# Patient Record
Sex: FEMALE | Race: WHITE | NOT HISPANIC OR LATINO | Employment: FULL TIME | URBAN - NONMETROPOLITAN AREA
[De-identification: names, ages, dates, MRNs, and addresses within clinical notes are randomized per-mention and may not be internally consistent; named-entity substitution may affect disease eponyms.]

---

## 2018-01-10 NOTE — PROGRESS NOTES
Chief Complaint  PATIENT PRESENTED TO THE OFFICE THIS MORNING FOR HER B12 INJECTION  PATIENT PROVIDES HER OWN SERUM  SHE RECEIVED THE INJECTION IN HER LEFT DELTOID MUSCLE      Active Problems    1  Abdominal pain, epigastric (789 06) (R10 13)   2  Adult hypothyroidism (244 9) (E03 9)   3  Anxiety disorder (300 00) (F41 9)   4  Paresthesias (782 0) (R20 2)   5  Screening for ischemic heart disease (V81 0) (Z13 6)   6  Vitamin B12 deficiency (266 2) (E53 8)    Current Meds   1  BD Luer-Ramana Syringe 23G X 1" 3 ML Miscellaneous; USE AS DIRECTED; Therapy: 36VAM9100 to (Last Rx:16Jup9582)  Requested for: 94ZCW8791 Ordered   2  Cyanocobalamin 1000 MCG/ML Injection Solution; Inject 1 ml intramuscularly weekly for   4 weeks,    then inject 1 ml  intramuscularly every other week for 4 weeks; Therapy: 03AVF0350 to (Last Rx:39Jhr8948)  Requested for: 43FRH3112 Ordered   3  Levothyroxine Sodium 25 MCG Oral Tablet; TAKE 1 TABLET DAILY AS DIRECTED; Therapy: 55MLL6429 to (Evaluate:06Jun2016)  Requested for: 89TAV3862; Last   Rx:[id number] Ordered   4  Pantoprazole Sodium 40 MG Oral Tablet Delayed Release; TAKE 1 TABLET DAILY; Therapy: 82Xap5146 to (Evaluate:23Goo7009)  Requested for: 60Iyc6338; Last   Rx:02Pfe9671 Ordered    Allergies    1   No Known Drug Allergies    Future Appointments    Date/Time Provider Specialty Site   01/18/2016 08:45 AM [address] [place] [address], Nurse Schedule  ST [address] [place] [address]   Electronically signed by : Rojelio Terrazas MD; Jan 11 2016  5:21PM EST                       (Author)

## 2018-01-15 NOTE — PROGRESS NOTES
Chief Complaint  PATIENT HAD A SCHEDULED VISIT TODAY FOR HER B12 INJECTION  THE PATIENT PROVIDES HER OWN SERUM  THE SHOT WAS PLACED IN HER LEFT DELTOID      Active Problems     1  Abdominal pain, epigastric (789 06) (R10 13)   2  Anxiety disorder (300 00) (F41 9)   3  Paresthesias (782 0) (R20 2)   4  Screening for ischemic heart disease (V81 0) (Z13 6)    Vitamin B12 deficiency (266 2) (E53 8)       Adult hypothyroidism (244 9) (E03 9)          Current Meds   1  BD Luer-Ramana Syringe 23G X 1" 3 ML Miscellaneous; USE AS DIRECTED; Therapy: 00KYB3114 to (Last Rx:78Bbz9547)  Requested for: 83OCI1203 Ordered   2  Cyanocobalamin 1000 MCG/ML Injection Solution; Inject 1 ml intramuscularly weekly for   4 weeks,    then inject 1 ml  intramuscularly every other week for 4 weeks; Therapy: 76FJU7508 to (Last Rx:98Wah3524)  Requested for: 25RTC5390 Ordered   3  Levothyroxine Sodium 25 MCG Oral Tablet; TAKE 1 TABLET DAILY AS DIRECTED; Therapy: 71JFM1386 to (Evaluate:06Jun2016)  Requested for: 10CJK6218; Last   Rx:17Hvj0218 Ordered   4  Pantoprazole Sodium 40 MG Oral Tablet Delayed Release; TAKE 1 TABLET DAILY; Therapy: 04Ipz0631 to (Evaluate:00Wcc9177)  Requested for: 02Apr2015; Last   Rx:[id number] Ordered    Allergies    1   No Known Drug Allergies    Signatures   Electronically signed by : Jocelyn Olivarez MD; Apr 15 2016  9:48PM EST                       (Author)

## 2018-01-15 NOTE — PROGRESS NOTES
Chief Complaint  PATIENT WAS SEEN IN OFFICE TODAY FOR HER B12 INJECTION      Active Problems    1  Abdominal pain, epigastric (789 06) (R10 13)   2  Adult hypothyroidism (244 9) (E03 9)   3  Anxiety disorder (300 00) (F41 9)   4  Paresthesias (782 0) (R20 2)   5  Screening for ischemic heart disease (V81 0) (Z13 6)   6  Vitamin B12 deficiency (266 2) (E53 8)    Current Meds   1  BD Luer-Ramana Syringe 23G X 1" 3 ML Miscellaneous; USE AS DIRECTED; Therapy: 53KWK0718 to (Last Rx:90Yoz0976)  Requested for: 97KMU9547 Ordered   2  Cyanocobalamin 1000 MCG/ML Injection Solution; Inject 1 ml intramuscularly weekly for   4 weeks,    then inject 1 ml  intramuscularly every other week for 4 weeks; Therapy: 14DZY9022 to (Last Rx:23Tmv2064)  Requested for: 21ESH8535 Ordered   3  Levothyroxine Sodium 25 MCG Oral Tablet; TAKE 1 TABLET DAILY AS DIRECTED; Therapy: 14LVU3705 to (Evaluate:06Jun2016)  Requested for: 63WEK2171; Last   Rx:26Acg4181 Ordered   4  Pantoprazole Sodium 40 MG Oral Tablet Delayed Release; TAKE 1 TABLET DAILY; Therapy: 41Hvt2565 to (Evaluate:70Cky7956)  Requested for: 01Yio1166; Last   Rx:70Gao2859 Ordered    Allergies    1   No Known Drug Allergies    Future Appointments    Date/Time Provider Specialty Site   02/01/2016 08:30 AM [facility], Nurse Schedule  ST [address] [place] [address]   Electronically signed by : Giulia Cast MD; Jan 18 2016  5:31PM EST                       (Author)

## 2018-01-18 NOTE — PROGRESS NOTES
Chief Complaint  PATIENT HAD A SCHEDULED VISIT FOR A B12 SHOT  THE PATIENT PROVIDES HER OWN SERUM  THE SHOT WAS GIVEN IN THE RIGHT DELTOID      Active Problems    1  Abdominal pain, epigastric (789 06) (R10 13)   2  Adult hypothyroidism (244 9) (E03 9)   3  Anxiety disorder (300 00) (F41 9)   4  Paresthesias (782 0) (R20 2)   5  Screening for ischemic heart disease (V81 0) (Z13 6)   6  Vitamin B12 deficiency (266 2) (E53 8)    Current Meds   1  BD Luer-Ramana Syringe 23G X 1" 3 ML Miscellaneous; USE AS DIRECTED; Therapy: 08VFX1363 to (Last Rx:41Qdq9442)  Requested for: 45AHH8768 Ordered   2  Cyanocobalamin 1000 MCG/ML Injection Solution; Inject 1 ml intramuscularly weekly for   4 weeks,    then inject 1 ml  intramuscularly every other week for 4 weeks; Therapy: 35TXB8506 to (Last Rx:27Sty4454)  Requested for: 11YKS0389 Ordered   3  Levothyroxine Sodium 25 MCG Oral Tablet; TAKE 1 TABLET DAILY AS DIRECTED; Therapy: 01JMD0136 to (Evaluate:06Jun2016)  Requested for: 35OJU6911; Last   Rx:86Ybu5036 Ordered   4  Pantoprazole Sodium 40 MG Oral Tablet Delayed Release; TAKE 1 TABLET DAILY; Therapy: 95Nba0015 to (Evaluate:84Pvb2935)  Requested for: 43Sgw6623; Last   Rx:11Cwb9713 Ordered    Allergies    1   No Known Drug Allergies    Future Appointments    Date/Time Provider Specialty Site   02/15/2016 08:30 AM [facility], Nurse Schedule  ST [address] [place] [address]     Signatures   Electronically signed by : Kasey Aguirre MD; Feb 7 2016 10:10PM EST                       (Author)

## 2020-04-27 ENCOUNTER — HOSPITAL ENCOUNTER (EMERGENCY)
Facility: HOSPITAL | Age: 45
End: 2020-04-27
Admitting: EMERGENCY MEDICINE
Payer: OTHER MISCELLANEOUS

## 2020-04-27 ENCOUNTER — APPOINTMENT (EMERGENCY)
Dept: RADIOLOGY | Facility: HOSPITAL | Age: 45
End: 2020-04-27
Payer: OTHER MISCELLANEOUS

## 2020-04-27 ENCOUNTER — HOSPITAL ENCOUNTER (OUTPATIENT)
Facility: HOSPITAL | Age: 45
Setting detail: OBSERVATION
Discharge: HOME | End: 2020-04-27
Attending: EMERGENCY MEDICINE | Admitting: ORTHOPAEDIC SURGERY
Payer: OTHER MISCELLANEOUS

## 2020-04-27 ENCOUNTER — APPOINTMENT (OUTPATIENT)
Dept: RADIOLOGY | Facility: HOSPITAL | Age: 45
End: 2020-04-27
Payer: OTHER MISCELLANEOUS

## 2020-04-27 ENCOUNTER — ANESTHESIA (EMERGENCY)
Dept: PERIOP | Facility: HOSPITAL | Age: 45
End: 2020-04-27
Payer: OTHER MISCELLANEOUS

## 2020-04-27 ENCOUNTER — ANESTHESIA EVENT (EMERGENCY)
Dept: PERIOP | Facility: HOSPITAL | Age: 45
End: 2020-04-27
Payer: OTHER MISCELLANEOUS

## 2020-04-27 VITALS
RESPIRATION RATE: 20 BRPM | TEMPERATURE: 98.8 F | DIASTOLIC BLOOD PRESSURE: 66 MMHG | BODY MASS INDEX: 21.12 KG/M2 | OXYGEN SATURATION: 90 % | SYSTOLIC BLOOD PRESSURE: 112 MMHG | HEART RATE: 88 BPM | WEIGHT: 143 LBS

## 2020-04-27 VITALS
RESPIRATION RATE: 20 BRPM | BODY MASS INDEX: 21.23 KG/M2 | OXYGEN SATURATION: 100 % | HEART RATE: 71 BPM | TEMPERATURE: 97.6 F | WEIGHT: 143.74 LBS | SYSTOLIC BLOOD PRESSURE: 107 MMHG | DIASTOLIC BLOOD PRESSURE: 69 MMHG

## 2020-04-27 DIAGNOSIS — S61.412D LACERATION OF LEFT HAND, FOREIGN BODY PRESENCE UNSPECIFIED, SUBSEQUENT ENCOUNTER: ICD-10-CM

## 2020-04-27 DIAGNOSIS — S62.605A CLOSED DISPLACED FRACTURE OF PHALANX OF LEFT RING FINGER, UNSPECIFIED PHALANX, INITIAL ENCOUNTER: ICD-10-CM

## 2020-04-27 DIAGNOSIS — S63.259A DISLOCATION OF FINGER, INITIAL ENCOUNTER: ICD-10-CM

## 2020-04-27 DIAGNOSIS — S61.209D DEGLOVING INJURY OF FINGER, SUBSEQUENT ENCOUNTER: ICD-10-CM

## 2020-04-27 DIAGNOSIS — S68.119A AMPUTATION FINGER, INITIAL ENCOUNTER: Primary | ICD-10-CM

## 2020-04-27 DIAGNOSIS — T14.8XXA DEGLOVING INJURY: Primary | ICD-10-CM

## 2020-04-27 LAB
ABO GROUP BLD: NORMAL
ANION GAP SERPL CALCULATED.3IONS-SCNC: 6 MMOL/L (ref 4–13)
APTT PPP: 26 SECONDS (ref 23–37)
BASOPHILS # BLD AUTO: 0.09 THOUSANDS/ΜL (ref 0–0.1)
BASOPHILS NFR BLD AUTO: 1 % (ref 0–1)
BLD GP AB SCN SERPL QL: NEGATIVE
BUN SERPL-MCNC: 9 MG/DL (ref 5–25)
CALCIUM SERPL-MCNC: 8.2 MG/DL (ref 8.3–10.1)
CHLORIDE SERPL-SCNC: 106 MMOL/L (ref 100–108)
CO2 SERPL-SCNC: 28 MMOL/L (ref 21–32)
CREAT SERPL-MCNC: 0.93 MG/DL (ref 0.6–1.3)
EOSINOPHIL # BLD AUTO: 0.17 THOUSAND/ΜL (ref 0–0.61)
EOSINOPHIL NFR BLD AUTO: 3 % (ref 0–6)
ERYTHROCYTE [DISTWIDTH] IN BLOOD BY AUTOMATED COUNT: 13 % (ref 11.6–15.1)
GFR SERPL CREATININE-BSD FRML MDRD: 74 ML/MIN/1.73SQ M
GLUCOSE SERPL-MCNC: 104 MG/DL (ref 65–140)
HCG SERPL QL: NEGATIVE
HCT VFR BLD AUTO: 35.8 % (ref 34.8–46.1)
HGB BLD-MCNC: 12.1 G/DL (ref 11.5–15.4)
IMM GRANULOCYTES # BLD AUTO: 0.02 THOUSAND/UL (ref 0–0.2)
IMM GRANULOCYTES NFR BLD AUTO: 0 % (ref 0–2)
INR PPP: 0.98 (ref 0.84–1.19)
LYMPHOCYTES # BLD AUTO: 2.75 THOUSANDS/ΜL (ref 0.6–4.47)
LYMPHOCYTES NFR BLD AUTO: 44 % (ref 14–44)
MCH RBC QN AUTO: 32.7 PG (ref 26.8–34.3)
MCHC RBC AUTO-ENTMCNC: 33.8 G/DL (ref 31.4–37.4)
MCV RBC AUTO: 97 FL (ref 82–98)
MONOCYTES # BLD AUTO: 0.48 THOUSAND/ΜL (ref 0.17–1.22)
MONOCYTES NFR BLD AUTO: 8 % (ref 4–12)
NEUTROPHILS # BLD AUTO: 2.72 THOUSANDS/ΜL (ref 1.85–7.62)
NEUTS SEG NFR BLD AUTO: 44 % (ref 43–75)
NRBC BLD AUTO-RTO: 0 /100 WBCS
PLATELET # BLD AUTO: 314 THOUSANDS/UL (ref 149–390)
PMV BLD AUTO: 9.5 FL (ref 8.9–12.7)
POTASSIUM SERPL-SCNC: 3.6 MMOL/L (ref 3.5–5.3)
PROTHROMBIN TIME: 13 SECONDS (ref 11.6–14.5)
RBC # BLD AUTO: 3.7 MILLION/UL (ref 3.81–5.12)
RH BLD: POSITIVE
SODIUM SERPL-SCNC: 140 MMOL/L (ref 136–145)
SPECIMEN EXPIRATION DATE: NORMAL
WBC # BLD AUTO: 6.23 THOUSAND/UL (ref 4.31–10.16)

## 2020-04-27 PROCEDURE — 25600 CLTX DST RDL FX/EPHYS SEP WO: CPT | Performed by: ORTHOPAEDIC SURGERY

## 2020-04-27 PROCEDURE — 26750 TREAT FINGER FRACTURE EACH: CPT | Performed by: ORTHOPAEDIC SURGERY

## 2020-04-27 PROCEDURE — 86900 BLOOD TYPING SEROLOGIC ABO: CPT | Performed by: EMERGENCY MEDICINE

## 2020-04-27 PROCEDURE — 99285 EMERGENCY DEPT VISIT HI MDM: CPT | Performed by: EMERGENCY MEDICINE

## 2020-04-27 PROCEDURE — 99285 EMERGENCY DEPT VISIT HI MDM: CPT

## 2020-04-27 PROCEDURE — 86850 RBC ANTIBODY SCREEN: CPT | Performed by: EMERGENCY MEDICINE

## 2020-04-27 PROCEDURE — 84703 CHORIONIC GONADOTROPIN ASSAY: CPT | Performed by: EMERGENCY MEDICINE

## 2020-04-27 PROCEDURE — 11010 DEBRIDE SKIN AT FX SITE: CPT | Performed by: ORTHOPAEDIC SURGERY

## 2020-04-27 PROCEDURE — 73130 X-RAY EXAM OF HAND: CPT

## 2020-04-27 PROCEDURE — 96375 TX/PRO/DX INJ NEW DRUG ADDON: CPT

## 2020-04-27 PROCEDURE — 26951 AMPUTATION OF FINGER/THUMB: CPT | Performed by: ORTHOPAEDIC SURGERY

## 2020-04-27 PROCEDURE — 77071 MNL APPL STRS JT RADIOGRAPHY: CPT | Performed by: ORTHOPAEDIC SURGERY

## 2020-04-27 PROCEDURE — 73120 X-RAY EXAM OF HAND: CPT

## 2020-04-27 PROCEDURE — 96376 TX/PRO/DX INJ SAME DRUG ADON: CPT

## 2020-04-27 PROCEDURE — 86901 BLOOD TYPING SEROLOGIC RH(D): CPT | Performed by: EMERGENCY MEDICINE

## 2020-04-27 PROCEDURE — 36415 COLL VENOUS BLD VENIPUNCTURE: CPT | Performed by: EMERGENCY MEDICINE

## 2020-04-27 PROCEDURE — 85025 COMPLETE CBC W/AUTO DIFF WBC: CPT | Performed by: EMERGENCY MEDICINE

## 2020-04-27 PROCEDURE — 85610 PROTHROMBIN TIME: CPT | Performed by: EMERGENCY MEDICINE

## 2020-04-27 PROCEDURE — 80048 BASIC METABOLIC PNL TOTAL CA: CPT | Performed by: EMERGENCY MEDICINE

## 2020-04-27 PROCEDURE — C1713 ANCHOR/SCREW BN/BN,TIS/BN: HCPCS | Performed by: ORTHOPAEDIC SURGERY

## 2020-04-27 PROCEDURE — 26727 TREAT FINGER FRACTURE EACH: CPT | Performed by: ORTHOPAEDIC SURGERY

## 2020-04-27 PROCEDURE — 13132 CMPLX RPR F/C/C/M/N/AX/G/H/F: CPT | Performed by: ORTHOPAEDIC SURGERY

## 2020-04-27 PROCEDURE — 85730 THROMBOPLASTIN TIME PARTIAL: CPT | Performed by: EMERGENCY MEDICINE

## 2020-04-27 PROCEDURE — 99285 EMERGENCY DEPT VISIT HI MDM: CPT | Performed by: PHYSICIAN ASSISTANT

## 2020-04-27 PROCEDURE — 96365 THER/PROPH/DIAG IV INF INIT: CPT

## 2020-04-27 PROCEDURE — 99220 PR INITIAL OBSERVATION CARE/DAY 70 MINUTES: CPT | Performed by: ORTHOPAEDIC SURGERY

## 2020-04-27 DEVICE — C-WIRE PAK DOUBLE ENDED ORTHOPAEDIC WIRE, SPADE, .045" (1.14 MM)
Type: IMPLANTABLE DEVICE | Site: FINGER | Status: FUNCTIONAL
Brand: C-WIRE

## 2020-04-27 RX ORDER — SUCCINYLCHOLINE/SOD CL,ISO/PF 100 MG/5ML
SYRINGE (ML) INTRAVENOUS AS NEEDED
Status: DISCONTINUED | OUTPATIENT
Start: 2020-04-27 | End: 2020-04-27 | Stop reason: SURG

## 2020-04-27 RX ORDER — OXYCODONE HYDROCHLORIDE 10 MG/1
10 TABLET ORAL EVERY 4 HOURS PRN
Status: DISCONTINUED | OUTPATIENT
Start: 2020-04-27 | End: 2020-04-27 | Stop reason: HOSPADM

## 2020-04-27 RX ORDER — ACETAMINOPHEN 325 MG/1
975 TABLET ORAL EVERY 8 HOURS SCHEDULED
Status: DISCONTINUED | OUTPATIENT
Start: 2020-04-27 | End: 2020-04-27 | Stop reason: HOSPADM

## 2020-04-27 RX ORDER — ONDANSETRON 2 MG/ML
INJECTION INTRAMUSCULAR; INTRAVENOUS AS NEEDED
Status: DISCONTINUED | OUTPATIENT
Start: 2020-04-27 | End: 2020-04-27 | Stop reason: SURG

## 2020-04-27 RX ORDER — FENTANYL CITRATE/PF 50 MCG/ML
50 SYRINGE (ML) INJECTION
Status: DISCONTINUED | OUTPATIENT
Start: 2020-04-27 | End: 2020-04-27 | Stop reason: HOSPADM

## 2020-04-27 RX ORDER — FENTANYL CITRATE 50 UG/ML
100 INJECTION, SOLUTION INTRAMUSCULAR; INTRAVENOUS ONCE
Status: COMPLETED | OUTPATIENT
Start: 2020-04-27 | End: 2020-04-27

## 2020-04-27 RX ORDER — KETOROLAC TROMETHAMINE 30 MG/ML
INJECTION, SOLUTION INTRAMUSCULAR; INTRAVENOUS AS NEEDED
Status: DISCONTINUED | OUTPATIENT
Start: 2020-04-27 | End: 2020-04-27 | Stop reason: SURG

## 2020-04-27 RX ORDER — OXYCODONE HYDROCHLORIDE 5 MG/1
5 TABLET ORAL EVERY 4 HOURS PRN
Status: DISCONTINUED | OUTPATIENT
Start: 2020-04-27 | End: 2020-04-27 | Stop reason: HOSPADM

## 2020-04-27 RX ORDER — CEFAZOLIN SODIUM 2 G/50ML
SOLUTION INTRAVENOUS AS NEEDED
Status: DISCONTINUED | OUTPATIENT
Start: 2020-04-27 | End: 2020-04-27 | Stop reason: SURG

## 2020-04-27 RX ORDER — DOCUSATE SODIUM 100 MG/1
100 CAPSULE, LIQUID FILLED ORAL 2 TIMES DAILY
Status: DISCONTINUED | OUTPATIENT
Start: 2020-04-27 | End: 2020-04-27 | Stop reason: HOSPADM

## 2020-04-27 RX ORDER — BUPIVACAINE HYDROCHLORIDE 2.5 MG/ML
INJECTION, SOLUTION EPIDURAL; INFILTRATION; INTRACAUDAL AS NEEDED
Status: DISCONTINUED | OUTPATIENT
Start: 2020-04-27 | End: 2020-04-27 | Stop reason: HOSPADM

## 2020-04-27 RX ORDER — MIDAZOLAM HYDROCHLORIDE 2 MG/2ML
INJECTION, SOLUTION INTRAMUSCULAR; INTRAVENOUS AS NEEDED
Status: DISCONTINUED | OUTPATIENT
Start: 2020-04-27 | End: 2020-04-27 | Stop reason: SURG

## 2020-04-27 RX ORDER — ONDANSETRON 2 MG/ML
4 INJECTION INTRAMUSCULAR; INTRAVENOUS ONCE AS NEEDED
Status: DISCONTINUED | OUTPATIENT
Start: 2020-04-27 | End: 2020-04-27 | Stop reason: HOSPADM

## 2020-04-27 RX ORDER — DEXAMETHASONE SODIUM PHOSPHATE 10 MG/ML
INJECTION, SOLUTION INTRAMUSCULAR; INTRAVENOUS AS NEEDED
Status: DISCONTINUED | OUTPATIENT
Start: 2020-04-27 | End: 2020-04-27 | Stop reason: SURG

## 2020-04-27 RX ORDER — LIDOCAINE HYDROCHLORIDE 10 MG/ML
20 INJECTION, SOLUTION EPIDURAL; INFILTRATION; INTRACAUDAL; PERINEURAL ONCE
Status: COMPLETED | OUTPATIENT
Start: 2020-04-27 | End: 2020-04-27

## 2020-04-27 RX ORDER — ONDANSETRON 2 MG/ML
4 INJECTION INTRAMUSCULAR; INTRAVENOUS EVERY 6 HOURS PRN
Status: DISCONTINUED | OUTPATIENT
Start: 2020-04-27 | End: 2020-04-27 | Stop reason: HOSPADM

## 2020-04-27 RX ORDER — LIDOCAINE HYDROCHLORIDE 10 MG/ML
INJECTION, SOLUTION EPIDURAL; INFILTRATION; INTRACAUDAL; PERINEURAL AS NEEDED
Status: DISCONTINUED | OUTPATIENT
Start: 2020-04-27 | End: 2020-04-27 | Stop reason: HOSPADM

## 2020-04-27 RX ORDER — LEVOTHYROXINE SODIUM 0.05 MG/1
50 TABLET ORAL DAILY
COMMUNITY
Start: 2015-12-09

## 2020-04-27 RX ORDER — OXYCODONE HYDROCHLORIDE 5 MG/1
5 TABLET ORAL EVERY 6 HOURS PRN
Qty: 20 TABLET | Refills: 0 | Status: SHIPPED | OUTPATIENT
Start: 2020-04-27 | End: 2020-05-07

## 2020-04-27 RX ORDER — HYDROMORPHONE HCL/PF 1 MG/ML
0.5 SYRINGE (ML) INJECTION
Status: DISCONTINUED | OUTPATIENT
Start: 2020-04-27 | End: 2020-04-27 | Stop reason: HOSPADM

## 2020-04-27 RX ORDER — CITALOPRAM 20 MG/1
20 TABLET ORAL DAILY
Status: DISCONTINUED | OUTPATIENT
Start: 2020-04-27 | End: 2020-04-27 | Stop reason: HOSPADM

## 2020-04-27 RX ORDER — SENNOSIDES 8.6 MG
1 TABLET ORAL DAILY
Status: DISCONTINUED | OUTPATIENT
Start: 2020-04-27 | End: 2020-04-27 | Stop reason: HOSPADM

## 2020-04-27 RX ORDER — LIDOCAINE HYDROCHLORIDE 10 MG/ML
INJECTION, SOLUTION EPIDURAL; INFILTRATION; INTRACAUDAL; PERINEURAL AS NEEDED
Status: DISCONTINUED | OUTPATIENT
Start: 2020-04-27 | End: 2020-04-27 | Stop reason: SURG

## 2020-04-27 RX ORDER — SODIUM CHLORIDE, SODIUM LACTATE, POTASSIUM CHLORIDE, CALCIUM CHLORIDE 600; 310; 30; 20 MG/100ML; MG/100ML; MG/100ML; MG/100ML
INJECTION, SOLUTION INTRAVENOUS CONTINUOUS PRN
Status: DISCONTINUED | OUTPATIENT
Start: 2020-04-27 | End: 2020-04-27 | Stop reason: SURG

## 2020-04-27 RX ORDER — EPHEDRINE SULFATE 50 MG/ML
INJECTION INTRAVENOUS AS NEEDED
Status: DISCONTINUED | OUTPATIENT
Start: 2020-04-27 | End: 2020-04-27 | Stop reason: SURG

## 2020-04-27 RX ORDER — HYDROMORPHONE HCL/PF 1 MG/ML
0.5 SYRINGE (ML) INJECTION
Status: DISCONTINUED | OUTPATIENT
Start: 2020-04-27 | End: 2020-04-27

## 2020-04-27 RX ORDER — FENTANYL CITRATE 50 UG/ML
INJECTION, SOLUTION INTRAMUSCULAR; INTRAVENOUS AS NEEDED
Status: DISCONTINUED | OUTPATIENT
Start: 2020-04-27 | End: 2020-04-27 | Stop reason: SURG

## 2020-04-27 RX ORDER — LEVOTHYROXINE SODIUM 0.05 MG/1
50 TABLET ORAL
Status: DISCONTINUED | OUTPATIENT
Start: 2020-04-28 | End: 2020-04-27 | Stop reason: HOSPADM

## 2020-04-27 RX ORDER — MAGNESIUM HYDROXIDE 1200 MG/15ML
LIQUID ORAL AS NEEDED
Status: DISCONTINUED | OUTPATIENT
Start: 2020-04-27 | End: 2020-04-27 | Stop reason: HOSPADM

## 2020-04-27 RX ORDER — CHLORHEXIDINE GLUCONATE 0.12 MG/ML
15 RINSE ORAL ONCE
Status: CANCELLED | OUTPATIENT
Start: 2020-04-27 | End: 2020-04-27

## 2020-04-27 RX ORDER — PROPOFOL 10 MG/ML
INJECTION, EMULSION INTRAVENOUS AS NEEDED
Status: DISCONTINUED | OUTPATIENT
Start: 2020-04-27 | End: 2020-04-27 | Stop reason: SURG

## 2020-04-27 RX ORDER — CALCIUM CARBONATE 200(500)MG
1000 TABLET,CHEWABLE ORAL DAILY PRN
Status: DISCONTINUED | OUTPATIENT
Start: 2020-04-27 | End: 2020-04-27 | Stop reason: HOSPADM

## 2020-04-27 RX ORDER — OXYCODONE HYDROCHLORIDE 5 MG/1
5 TABLET ORAL EVERY 6 HOURS PRN
Qty: 20 TABLET | Refills: 0 | Status: SHIPPED | OUTPATIENT
Start: 2020-04-27 | End: 2020-04-27 | Stop reason: SDUPTHER

## 2020-04-27 RX ORDER — CITALOPRAM 20 MG/1
20 TABLET ORAL DAILY
COMMUNITY
Start: 2019-10-01

## 2020-04-27 RX ORDER — CEFAZOLIN SODIUM 2 G/50ML
2000 SOLUTION INTRAVENOUS ONCE
Status: COMPLETED | OUTPATIENT
Start: 2020-04-27 | End: 2020-04-27

## 2020-04-27 RX ADMIN — EPHEDRINE SULFATE 10 MG: 50 INJECTION, SOLUTION INTRAVENOUS at 16:48

## 2020-04-27 RX ADMIN — CEFAZOLIN SODIUM 2000 MG: 2 SOLUTION INTRAVENOUS at 16:12

## 2020-04-27 RX ADMIN — FENTANYL CITRATE 100 MCG: 0.05 INJECTION, SOLUTION INTRAMUSCULAR; INTRAVENOUS at 11:00

## 2020-04-27 RX ADMIN — FENTANYL CITRATE 100 MCG: 0.05 INJECTION, SOLUTION INTRAMUSCULAR; INTRAVENOUS at 12:27

## 2020-04-27 RX ADMIN — LIDOCAINE HYDROCHLORIDE 20 ML: 10 INJECTION, SOLUTION EPIDURAL; INFILTRATION; INTRACAUDAL; PERINEURAL at 14:14

## 2020-04-27 RX ADMIN — Medication 100 MG: at 16:09

## 2020-04-27 RX ADMIN — PROPOFOL 50 MG: 10 INJECTION, EMULSION INTRAVENOUS at 18:00

## 2020-04-27 RX ADMIN — LIDOCAINE HYDROCHLORIDE 50 MG: 10 INJECTION, SOLUTION EPIDURAL; INFILTRATION; INTRACAUDAL; PERINEURAL at 16:09

## 2020-04-27 RX ADMIN — SODIUM CHLORIDE, SODIUM LACTATE, POTASSIUM CHLORIDE, AND CALCIUM CHLORIDE: .6; .31; .03; .02 INJECTION, SOLUTION INTRAVENOUS at 16:02

## 2020-04-27 RX ADMIN — ONDANSETRON 4 MG: 2 INJECTION INTRAMUSCULAR; INTRAVENOUS at 16:18

## 2020-04-27 RX ADMIN — SODIUM CHLORIDE, SODIUM LACTATE, POTASSIUM CHLORIDE, AND CALCIUM CHLORIDE: .6; .31; .03; .02 INJECTION, SOLUTION INTRAVENOUS at 17:47

## 2020-04-27 RX ADMIN — MIDAZOLAM 2 MG: 1 INJECTION INTRAMUSCULAR; INTRAVENOUS at 16:05

## 2020-04-27 RX ADMIN — KETOROLAC TROMETHAMINE 30 MG: 30 INJECTION, SOLUTION INTRAMUSCULAR at 17:48

## 2020-04-27 RX ADMIN — CEFAZOLIN SODIUM 2000 MG: 2 SOLUTION INTRAVENOUS at 11:06

## 2020-04-27 RX ADMIN — FENTANYL CITRATE 25 MCG: 50 INJECTION, SOLUTION INTRAMUSCULAR; INTRAVENOUS at 17:21

## 2020-04-27 RX ADMIN — PROPOFOL 170 MG: 10 INJECTION, EMULSION INTRAVENOUS at 16:09

## 2020-04-27 RX ADMIN — FENTANYL CITRATE 50 MCG: 50 INJECTION, SOLUTION INTRAMUSCULAR; INTRAVENOUS at 16:05

## 2020-04-27 RX ADMIN — DEXAMETHASONE SODIUM PHOSPHATE 8 MG: 10 INJECTION, SOLUTION INTRAMUSCULAR; INTRAVENOUS at 16:14

## 2020-04-27 RX ADMIN — FENTANYL CITRATE 25 MCG: 50 INJECTION, SOLUTION INTRAMUSCULAR; INTRAVENOUS at 16:34

## 2020-04-27 RX ADMIN — EPHEDRINE SULFATE 5 MG: 50 INJECTION, SOLUTION INTRAVENOUS at 16:44

## 2020-05-01 ENCOUNTER — TELEPHONE (OUTPATIENT)
Dept: OBGYN CLINIC | Facility: HOSPITAL | Age: 45
End: 2020-05-01

## 2020-05-01 ENCOUNTER — TELEPHONE (OUTPATIENT)
Dept: OTHER | Facility: OTHER | Age: 45
End: 2020-05-01

## 2020-05-05 ENCOUNTER — OFFICE VISIT (OUTPATIENT)
Dept: OBGYN CLINIC | Facility: MEDICAL CENTER | Age: 45
End: 2020-05-05

## 2020-05-05 ENCOUNTER — OFFICE VISIT (OUTPATIENT)
Dept: PHYSICAL THERAPY | Facility: MEDICAL CENTER | Age: 45
End: 2020-05-05
Payer: OTHER MISCELLANEOUS

## 2020-05-05 VITALS — TEMPERATURE: 98.4 F

## 2020-05-05 DIAGNOSIS — S61.209D DEGLOVING INJURY OF FINGER, SUBSEQUENT ENCOUNTER: Primary | ICD-10-CM

## 2020-05-05 DIAGNOSIS — S61.412D LACERATION OF LEFT HAND, FOREIGN BODY PRESENCE UNSPECIFIED, SUBSEQUENT ENCOUNTER: ICD-10-CM

## 2020-05-05 DIAGNOSIS — Z98.890 S/P MUSCULOSKELETAL SYSTEM SURGERY: ICD-10-CM

## 2020-05-05 PROCEDURE — L3913 HFO W/O JOINTS CF: HCPCS

## 2020-05-05 PROCEDURE — 99024 POSTOP FOLLOW-UP VISIT: CPT | Performed by: ORTHOPAEDIC SURGERY

## 2020-05-07 ENCOUNTER — TELEPHONE (OUTPATIENT)
Dept: OBGYN CLINIC | Facility: CLINIC | Age: 45
End: 2020-05-07

## 2020-05-12 ENCOUNTER — TELEPHONE (OUTPATIENT)
Dept: OBGYN CLINIC | Facility: HOSPITAL | Age: 45
End: 2020-05-12

## 2020-05-19 ENCOUNTER — OFFICE VISIT (OUTPATIENT)
Dept: OBGYN CLINIC | Facility: MEDICAL CENTER | Age: 45
End: 2020-05-19

## 2020-05-19 ENCOUNTER — APPOINTMENT (OUTPATIENT)
Dept: RADIOLOGY | Facility: MEDICAL CENTER | Age: 45
End: 2020-05-19
Payer: OTHER MISCELLANEOUS

## 2020-05-19 DIAGNOSIS — S61.209D DEGLOVING INJURY OF FINGER, SUBSEQUENT ENCOUNTER: ICD-10-CM

## 2020-05-19 DIAGNOSIS — S61.209D DEGLOVING INJURY OF FINGER, SUBSEQUENT ENCOUNTER: Primary | ICD-10-CM

## 2020-05-19 PROCEDURE — 73130 X-RAY EXAM OF HAND: CPT

## 2020-05-19 PROCEDURE — 99024 POSTOP FOLLOW-UP VISIT: CPT | Performed by: ORTHOPAEDIC SURGERY

## 2020-06-09 ENCOUNTER — OFFICE VISIT (OUTPATIENT)
Dept: OBGYN CLINIC | Facility: MEDICAL CENTER | Age: 45
End: 2020-06-09

## 2020-06-09 ENCOUNTER — APPOINTMENT (OUTPATIENT)
Dept: RADIOLOGY | Facility: MEDICAL CENTER | Age: 45
End: 2020-06-09
Payer: OTHER MISCELLANEOUS

## 2020-06-09 VITALS
TEMPERATURE: 98.6 F | SYSTOLIC BLOOD PRESSURE: 104 MMHG | WEIGHT: 133 LBS | BODY MASS INDEX: 20.16 KG/M2 | HEIGHT: 68 IN | DIASTOLIC BLOOD PRESSURE: 70 MMHG | HEART RATE: 86 BPM

## 2020-06-09 DIAGNOSIS — S61.209D DEGLOVING INJURY OF FINGER, SUBSEQUENT ENCOUNTER: ICD-10-CM

## 2020-06-09 DIAGNOSIS — S61.209D DEGLOVING INJURY OF FINGER, SUBSEQUENT ENCOUNTER: Primary | ICD-10-CM

## 2020-06-09 PROCEDURE — 99024 POSTOP FOLLOW-UP VISIT: CPT | Performed by: ORTHOPAEDIC SURGERY

## 2020-06-09 PROCEDURE — 73130 X-RAY EXAM OF HAND: CPT

## 2020-06-09 RX ORDER — GABAPENTIN 300 MG/1
300 CAPSULE ORAL
Qty: 28 CAPSULE | Refills: 0 | Status: SHIPPED | OUTPATIENT
Start: 2020-06-09 | End: 2020-06-22 | Stop reason: SDUPTHER

## 2020-06-16 ENCOUNTER — TELEPHONE (OUTPATIENT)
Dept: OBGYN CLINIC | Facility: HOSPITAL | Age: 45
End: 2020-06-16

## 2020-06-22 ENCOUNTER — TELEPHONE (OUTPATIENT)
Dept: OBGYN CLINIC | Facility: CLINIC | Age: 45
End: 2020-06-22

## 2020-06-22 DIAGNOSIS — S61.209D DEGLOVING INJURY OF FINGER, SUBSEQUENT ENCOUNTER: ICD-10-CM

## 2020-06-22 RX ORDER — GABAPENTIN 300 MG/1
300 CAPSULE ORAL 3 TIMES DAILY
Qty: 90 CAPSULE | Refills: 0 | Status: SHIPPED | OUTPATIENT
Start: 2020-06-22 | End: 2020-06-29

## 2020-06-29 DIAGNOSIS — S61.209D DEGLOVING INJURY OF FINGER, SUBSEQUENT ENCOUNTER: ICD-10-CM

## 2020-06-29 RX ORDER — GABAPENTIN 300 MG/1
CAPSULE ORAL
Qty: 28 CAPSULE | Refills: 0 | Status: SHIPPED | OUTPATIENT
Start: 2020-06-29 | End: 2020-07-14

## 2020-07-07 ENCOUNTER — OFFICE VISIT (OUTPATIENT)
Dept: OBGYN CLINIC | Facility: MEDICAL CENTER | Age: 45
End: 2020-07-07

## 2020-07-07 VITALS
DIASTOLIC BLOOD PRESSURE: 76 MMHG | HEIGHT: 68 IN | SYSTOLIC BLOOD PRESSURE: 119 MMHG | HEART RATE: 69 BPM | TEMPERATURE: 97.8 F | BODY MASS INDEX: 19.7 KG/M2 | WEIGHT: 130 LBS

## 2020-07-07 DIAGNOSIS — S61.209D DEGLOVING INJURY OF FINGER, SUBSEQUENT ENCOUNTER: ICD-10-CM

## 2020-07-07 DIAGNOSIS — S61.412D LACERATION OF LEFT HAND, FOREIGN BODY PRESENCE UNSPECIFIED, SUBSEQUENT ENCOUNTER: ICD-10-CM

## 2020-07-07 DIAGNOSIS — Z98.890 S/P MUSCULOSKELETAL SYSTEM SURGERY: Primary | ICD-10-CM

## 2020-07-07 PROCEDURE — 99024 POSTOP FOLLOW-UP VISIT: CPT | Performed by: ORTHOPAEDIC SURGERY

## 2020-07-07 NOTE — PROGRESS NOTES
History of the Present Illness     Carey West is a 38 y o  female who presents s/p left middle finger amputation to the proximal phalanx, and left ring finger proximal phalanx CR PP with non operative treatment of the distal phalanx fracture and radius ulnar fracture performed 4/27/2020  She is now 10 weeks postop  She reports that she has been consistent with formal physical therapy 2-3 times per week  Her therapist has discussed starting her using flexion splints during the day, and is planning to begin extension splints at night  She also reports that her sensitivity has significantly improved since her last visit  She continues to have very limited motion of the ring and small fingers, but she is starting to notice the ability to wiggle her fingers at the PIP and DIP joints which she is hopeful about  She has also had significant relief with gabapentin  Physical Exam     /76   Pulse 69   Temp 97 8 °F (36 6 °C)   Ht 5' 8" (1 727 m)   Wt 59 kg (130 lb)   BMI 19 77 kg/m²     Inspection and examination of left upper extremity shows scars and lacerations are maturing nicely  Skin is warm and dry with no signs of erythema, ecchymosis, or infection  Significantly decreased hypersensitivity to all digits  She is able to fire a FDP tendon of her ring finger w/ minimal pull through  She is able demonstrate minimal active motion of the small finger PIP and DIP joints though passive motion is improved since last visit  [facility] is still 5-6 with passive motion with significant limitation to the ring finger flexion greater than small finger  2+ distal radial pulse with brisk capillary refill to the fingers  Sensation light touch intact distally  Data Review     Imaging:  No new imaging reviewed this visit    Assessment and Plan     Discussed with patient that today's physical exam does show some progress toward regaining motor ability of the ring and small fingers    However she still continues to exhibit significant stiffness in the DIP and PIP joints of the ring and small fingers  Encouraged patient that she needs to work aggressively on both in physical therapy, and at home, on performing passive and active assistive motion exercises  She should perform these exercises as many times a day as she can tolerate  A new physical therapy script has been provided with recommendation for aggressive range of motion therapy  She is cleared to progress activities as tolerated without restrictions  We reasonably expect that she will not be able to engage in all activities immediately, but she should his challenge herself to use her left hand as much as possible, as safely as possible  She can continue gabapentin as previously prescribed  At this time, her functional limitations will not enable her to return to her pre-injury work responsibilities, and a note has been provided to keep her out of work until she was re-examined  I discussed that there is a high likelihood that she is progressing to significant contractures of the ring and small fingers such that she may benefit from contracture releases to the ring and small finger  This may also require flexor tenolysis  Discussed with her that if we were to do this, we would start with the extensor side 1st and then work on the flexor side as we do not perform these surgeries concomitantly  She is a very long way off from maximal medical improvement as this is contingent on how nonoperative modalities such as therapy goes and the potential need for surgery  Demonstrated some exercises for her today which she is going to repeat on her own      Follow Up:  1 month    To Do Next Visit: Re-evaluation of current issue and ROM check    PROCEDURES PERFORMED:  Procedures  No Procedures performed today    Scribe Attestation    I,:   Mindi Stubbs am acting as a scribe while in the presence of the attending physician :        I,:   Tor Dougherty MD personally performed the services described in this documentation    as scribed in my presence :

## 2020-07-07 NOTE — LETTER
July 7, 2020     Patient: Rosalba Hammans   YOB: 1975   Date of Visit: 7/7/2020       To Whom it May Concern:    Rosalba Hammans is under my professional care  She was seen in my office on 7/7/2020  She is to remain out of work until cleared by physician  She will be seen for follow up in one month  If you have any questions or concerns, please don't hesitate to call           Sincerely,          Rowena Davis MD        CC: No Recipients

## 2020-07-14 DIAGNOSIS — S61.209D DEGLOVING INJURY OF FINGER, SUBSEQUENT ENCOUNTER: ICD-10-CM

## 2020-07-14 RX ORDER — GABAPENTIN 300 MG/1
CAPSULE ORAL
Qty: 90 CAPSULE | Refills: 0 | Status: SHIPPED | OUTPATIENT
Start: 2020-07-14 | End: 2020-09-04

## 2020-08-17 ENCOUNTER — OFFICE VISIT (OUTPATIENT)
Dept: OBGYN CLINIC | Facility: MEDICAL CENTER | Age: 45
End: 2020-08-17
Payer: OTHER MISCELLANEOUS

## 2020-08-17 VITALS
WEIGHT: 130 LBS | HEIGHT: 68 IN | BODY MASS INDEX: 19.7 KG/M2 | DIASTOLIC BLOOD PRESSURE: 72 MMHG | SYSTOLIC BLOOD PRESSURE: 122 MMHG | TEMPERATURE: 98.2 F | HEART RATE: 61 BPM

## 2020-08-17 DIAGNOSIS — S61.209D DEGLOVING INJURY OF FINGER, SUBSEQUENT ENCOUNTER: Primary | ICD-10-CM

## 2020-08-17 PROCEDURE — 99213 OFFICE O/P EST LOW 20 MIN: CPT | Performed by: ORTHOPAEDIC SURGERY

## 2020-08-17 NOTE — PROGRESS NOTES
Chief Complaint     Left hand stiffness      History of Present Illness     Rachael Kohler is a 38 y o  female presents with continued left hand stiffness status post left middle finger revision amputation and neurolysis of left ring finger and closed reduction percutaneous pinning of the proximal phalanx fracture of the ring finger  Patient notes continued stiffness  Does not feel it she is making significant progress  Working every day on motion exercises instructed by the therapist   Denies any significant numbness and tingling though the ring finger does feel a bit different          Past Medical History:   Diagnosis Date    Anxiety     Disease of thyroid gland        Past Surgical History:   Procedure Laterality Date    FINGER AMPUTATION Left 4/27/2020    Procedure: REVISION AMPUTATION LONG/MIDDLE FINGER;  Surgeon: Johan Junior MD;  [facility];  Service: Orthopedics    INCISION AND DRAINAGE OF WOUND Left 4/27/2020    Procedure: INCISION AND DRAINAGE (I&D) LEFT HAND;  Surgeon: Johan Junior MD;  [facility];  Service: Orthopedics    ORIF FINGER FRACTURE Left 4/27/2020    Procedure: OPEN REDUCTION W/ INTERNAL FIXATION (ORIF)  RING FINGER PROXIMAL PHALANX;  Surgeon: Johan Junior MD;  [facility];  Service: Orthopedics       No Known Allergies    Current Outpatient Medications on File Prior to Visit   Medication Sig Dispense Refill    citalopram (CeleXA) 20 mg tablet Take 20 mg by mouth daily      gabapentin (NEURONTIN) 300 mg capsule TAKE 1 CAPSULE BY MOUTH THREE TIMES A DAY 90 capsule 0    levothyroxine 50 mcg tablet Take 50 mcg by mouth daily       No current facility-administered medications on file prior to visit  Social History     Tobacco Use    Smoking status: Never Smoker    Smokeless tobacco: Never Used   Substance Use Topics    Alcohol use: Yes     Frequency: Monthly or less    Drug use: Never       History reviewed   No pertinent family history  Review of Systems     As stated in the HPI  All other systems were reviewed and are negative  Physical Exam     /72   Pulse 61   Temp 98 2 °F (36 8 °C) (Temporal)   Ht 5' 8" (1 727 m)   Wt 59 kg (130 lb)   BMI 19 77 kg/m²     GENERAL: This is a well-developed, well-nourished, age-appropriate patient in no acute distress  The patient is alert and oriented x3  Pleasant and cooperative  Eyes: Anicteric sclerae  Extraocular movements appear intact  HENT: Nares are patent with no drainage  Lungs: There is equal chest rise on inspection  Breathing is non-labored with no audible wheezing  Cardiovascular: No cyanosis  No upper extremity lymphadema  Skin: Skin is warm to touch  No obvious skin lesions or rashes other than described below  Neurologic: No ataxia  Psychiatric: Mood and affect are appropriate  Examination of the left upper extremity reveals a well-healed stump on the middle finger  Slightly decreased sensation in the radial aspect of the ring finger though ulnar digital nerve is intact fully  5 mm 2 point discrimination both to the radial and ulnar aspect of the finger  Fires the FDP to the ring finger with minimal excursion  Passive range of motion is improved today at 2-3 cm [facility] after significant stretching is performed  There is still swelling about the ring finger  No tenderness to palpation of the proximal phalanx or A1 pulley  Brisk capillary refill    Data Review     Results Reviewed     None             Imaging:  None today    Assessment and Plan      Diagnoses and all orders for this visit:    Degloving injury of finger, subsequent encounter           48-year-old female with significant stiffness of the ring finger status post above surgery and injury  Patient had an avulsion mechanism to the middle finger which also subsequently likely impacted the FDP muscle belly to the ring finger    She also had significant neuropathic pain which limited her ability to be moving her ring finger after pinning of her proximal phalanx fracture  Given this, she has gotten significantly stiff  She is making progress however, despite her concerns  I am happy that she is at a [facility] of about 2-3 cm today and we spent at least 10 minutes discussing and demonstrating her exercise that I want her to perform on a daily basis  She will do so and continue to work on this  There is no flexor tendon injury that we need to wait to heal to perform a neurolysis, though her tissues are not fully mature and I would not like cause another insult to the finger and result in all of the swelling and limitation of motion again before she has fully regained as much passive motion as she can  I understand that she is likely going to get frustrated with the limited progress that she sees on a day-to-day basis, but I encouraged her to continue working hard at this since she is in fact making progress    I will see her back in 1 month    Follow Up:  1 month    To Do Next Visit:  3-view x-ray left ring finger    PROCEDURES PERFORMED:  Procedures  No Procedures performed today

## 2020-08-17 NOTE — LETTER
August 17, 2020     Patient: Smitha hPillips   YOB: 1975   Date of Visit: 8/17/2020       To Whom it May Concern:    Smitha Phillips is under my professional care  She was seen in my office on 8/17/2020  She may return to work though without the use of the left hand  Expected maximal medical improvement date approximately 1 year from injury  If you have any questions or concerns, please don't hesitate to call           Sincerely,          Petr Sheth MD        CC: No Recipients

## 2020-09-03 DIAGNOSIS — S61.209D DEGLOVING INJURY OF FINGER, SUBSEQUENT ENCOUNTER: ICD-10-CM

## 2020-09-04 RX ORDER — GABAPENTIN 300 MG/1
CAPSULE ORAL
Qty: 28 CAPSULE | Refills: 0 | Status: SHIPPED | OUTPATIENT
Start: 2020-09-04 | End: 2020-11-24 | Stop reason: ALTCHOICE

## 2020-09-17 ENCOUNTER — APPOINTMENT (OUTPATIENT)
Dept: RADIOLOGY | Facility: MEDICAL CENTER | Age: 45
End: 2020-09-17
Payer: COMMERCIAL

## 2020-09-17 ENCOUNTER — OFFICE VISIT (OUTPATIENT)
Dept: OBGYN CLINIC | Facility: MEDICAL CENTER | Age: 45
End: 2020-09-17
Payer: OTHER MISCELLANEOUS

## 2020-09-17 VITALS
SYSTOLIC BLOOD PRESSURE: 121 MMHG | BODY MASS INDEX: 20.92 KG/M2 | WEIGHT: 138 LBS | HEART RATE: 71 BPM | DIASTOLIC BLOOD PRESSURE: 75 MMHG | TEMPERATURE: 97.9 F | HEIGHT: 68 IN

## 2020-09-17 DIAGNOSIS — S61.209D DEGLOVING INJURY OF FINGER, SUBSEQUENT ENCOUNTER: Primary | ICD-10-CM

## 2020-09-17 DIAGNOSIS — S61.209D DEGLOVING INJURY OF FINGER, SUBSEQUENT ENCOUNTER: ICD-10-CM

## 2020-09-17 PROCEDURE — 99213 OFFICE O/P EST LOW 20 MIN: CPT | Performed by: ORTHOPAEDIC SURGERY

## 2020-09-17 PROCEDURE — 73140 X-RAY EXAM OF FINGER(S): CPT

## 2020-09-17 NOTE — LETTER
September 17, 2020     Patient: Sravani Schmidt   YOB: 1975   Date of Visit: 9/17/2020       To Whom it May Concern:    Sravani Schmidt is under my professional care  She was seen in my office on 9/17/2020  She may return to work with no use of her left hand  Re-evaluation in 6 weeks time  If you have any questions or concerns, please don't hesitate to call           Sincerely,          Juwan Ryan MD        CC: No Recipients

## 2020-09-17 NOTE — PROGRESS NOTES
Chief Complaint     S/p left degloving injury     History of Present Illness     Niki Maradiaga is a 38 y o  female has the office today status post left hand degloving injury  This is a continuation of worker's compensation case  Patient continues to work with hand therapy and an outside provider range of motion of the ring finger and desensitization of the long finger amputation site  She notes she continues to make some progress with her range of motion but continues to feel stiffness about finger has limited range of motion which decreases her ability to  objects  She does note pain particularly at the PIP joint on gripping objects firmly  She has been able to lift up to 4 lb with the hand in therapy  She denies any new injuries  She denies any numbness and tingling in the ring finger  She is not return to work as they do not have light duty available to her  Past Medical History:   Diagnosis Date    Anxiety     Disease of thyroid gland        Past Surgical History:   Procedure Laterality Date    FINGER AMPUTATION Left 4/27/2020    Procedure: REVISION AMPUTATION LONG/MIDDLE FINGER;  Surgeon: Radha Ayers MD;  [facility];  Service: Orthopedics    INCISION AND DRAINAGE OF WOUND Left 4/27/2020    Procedure: INCISION AND DRAINAGE (I&D) LEFT HAND;  Surgeon: Radha Ayers MD;  [facility];  Service: Orthopedics    ORIF FINGER FRACTURE Left 4/27/2020    Procedure: OPEN REDUCTION W/ INTERNAL FIXATION (ORIF)  RING FINGER PROXIMAL PHALANX;  Surgeon:  Radha Ayers MD;  [facility];  Service: Orthopedics       No Known Allergies    Current Outpatient Medications on File Prior to Visit   Medication Sig Dispense Refill    citalopram (CeleXA) 20 mg tablet Take 20 mg by mouth daily      gabapentin (NEURONTIN) 300 mg capsule TAKE 1 CAPSULE BY MOUTH EVERYDAY AT BEDTIME 28 capsule 0    levothyroxine 50 mcg tablet Take 50 mcg by mouth daily       No current facility-administered medications on file prior to visit  Social History     Tobacco Use    Smoking status: Never Smoker    Smokeless tobacco: Never Used   Substance Use Topics    Alcohol use: Yes     Frequency: Monthly or less    Drug use: Never       History reviewed  No pertinent family history  Review of Systems     As stated in the HPI  All other systems were reviewed and are negative  Physical Exam     /75   Pulse 71   Temp 97 9 °F (36 6 °C)   Ht 5' 8" (1 727 m)   Wt 62 6 kg (138 lb)   BMI 20 98 kg/m²     GENERAL: This is a well-developed, well-nourished, age-appropriate patient in no acute distress  The patient is alert and oriented x3  Pleasant and cooperative  Eyes: Anicteric sclerae  Extraocular movements appear intact  HENT: Nares are patent with no drainage  Lungs: There is equal chest rise on inspection  Breathing is non-labored with no audible wheezing  Cardiovascular: No cyanosis  No upper extremity lymphadema  Skin: Skin is warm to touch  No obvious skin lesions or rashes other than described below  Neurologic: No ataxia  Psychiatric: Mood and affect are appropriate  Left hand  Well-healed stump on the middle finger    Slight hypersensitivity  Improved passive at DIP and MCP motion in comparison to the last visit  Passive [facility] of the ring finger 1-2 cm  Springiness noted in MCP, DIP, and PIP joints  DIP continues to be the most restricted joint with 30° of flexion passively  Sensation intact to the ulnar and radial aspect of the ring finger  Brisk capillary refill noted  No forearm tenderness noted    Data Review     Results Reviewed     None             Imaging:  X-rays of the left hand obtained on 09/17/2020 were personally reviewed by me in the office and demonstrate healing at the fracture site of the ring finger    Assessment and Plan      Diagnoses and all orders for this visit:    Degloving injury of finger, subsequent encounter  -     XR finger left fourth digit-ring; Future           A 41-year-old female status post degloving injury to left hand  Patient and I discussed in regards to her ring finger that she is making progress with therapy  Discussed with her that she needs to have Full equilibrium of the tissues prior to any tenolysis  In the future I would like to offer her tenolysis hopefully regain her active range of motion  At this point time I do not feel that she is ready for this surgery  I discussed with her that it may be a few more visits until she achieves full tissue equilibrium with regaining full passive motion  She should continue to work aggressively on her range of motion with therapy  Provided her with a work note today stating that she is able to return to work with no use of her left upper extremity  I will see her back in 6 weeks time for re-evaluation  In the interim we will obtain an ultrasound to further evaluate the flexor tendon of the ring finger from the tip to the forearm for any discontinuity given her mechanism of injury to the adjacent finger       Follow Up:  6 weeks    To Do Next Visit:  Re-evaluation, potential surgical discussion    PROCEDURES PERFORMED:  Procedures  No Procedures performed today     Scribe Attestation    I,:   Mitzi Gimenez MA am acting as a scribe while in the presence of the attending physician :        I,:   Bry Rodriguez MD personally performed the services described in this documentation    as scribed in my presence :

## 2020-09-22 ENCOUNTER — HOSPITAL ENCOUNTER (OUTPATIENT)
Dept: RADIOLOGY | Facility: HOSPITAL | Age: 45
Discharge: HOME | End: 2020-09-22
Attending: ORTHOPAEDIC SURGERY | Admitting: ORTHOPAEDIC SURGERY
Payer: OTHER MISCELLANEOUS

## 2020-09-22 ENCOUNTER — TRANSCRIBE ORDERS (OUTPATIENT)
Dept: RADIOLOGY | Facility: HOSPITAL | Age: 45
End: 2020-09-22

## 2020-09-22 DIAGNOSIS — S61.209D DEGLOVING INJURY OF FINGER, SUBSEQUENT ENCOUNTER: ICD-10-CM

## 2020-09-22 PROCEDURE — 76882 US LMTD JT/FCL EVL NVASC XTR: CPT

## 2020-09-24 ENCOUNTER — TELEPHONE (OUTPATIENT)
Dept: OBGYN CLINIC | Facility: HOSPITAL | Age: 45
End: 2020-09-24

## 2020-10-28 ENCOUNTER — TELEPHONE (OUTPATIENT)
Dept: OBGYN CLINIC | Facility: HOSPITAL | Age: 45
End: 2020-10-28

## 2020-11-11 ENCOUNTER — OFFICE VISIT (OUTPATIENT)
Dept: OBGYN CLINIC | Facility: MEDICAL CENTER | Age: 45
End: 2020-11-11
Payer: OTHER MISCELLANEOUS

## 2020-11-11 ENCOUNTER — TELEPHONE (OUTPATIENT)
Dept: OBGYN CLINIC | Facility: MEDICAL CENTER | Age: 45
End: 2020-11-11

## 2020-11-11 VITALS
DIASTOLIC BLOOD PRESSURE: 87 MMHG | TEMPERATURE: 98.2 F | WEIGHT: 140 LBS | SYSTOLIC BLOOD PRESSURE: 121 MMHG | HEIGHT: 68 IN | BODY MASS INDEX: 21.22 KG/M2 | HEART RATE: 68 BPM

## 2020-11-11 DIAGNOSIS — S61.209D DEGLOVING INJURY OF FINGER, SUBSEQUENT ENCOUNTER: Primary | ICD-10-CM

## 2020-11-11 DIAGNOSIS — M67.80 ADHESIONS, FLEXOR OR EXTENSOR TENDONS: ICD-10-CM

## 2020-11-11 PROCEDURE — 99214 OFFICE O/P EST MOD 30 MIN: CPT | Performed by: ORTHOPAEDIC SURGERY

## 2020-11-11 RX ORDER — CEFAZOLIN SODIUM 2 G/50ML
2000 SOLUTION INTRAVENOUS ONCE
Status: CANCELLED | OUTPATIENT
Start: 2020-11-30 | End: 2020-11-11

## 2020-11-11 RX ORDER — LIDOCAINE HYDROCHLORIDE AND EPINEPHRINE 10; 10 MG/ML; UG/ML
20 INJECTION, SOLUTION INFILTRATION; PERINEURAL ONCE
Status: CANCELLED | OUTPATIENT
Start: 2020-11-11 | End: 2020-11-11

## 2020-11-12 ENCOUNTER — TELEPHONE (OUTPATIENT)
Dept: OBGYN CLINIC | Facility: HOSPITAL | Age: 45
End: 2020-11-12

## 2020-11-23 DIAGNOSIS — M67.80 ADHESIONS, FLEXOR OR EXTENSOR TENDONS: ICD-10-CM

## 2020-11-23 PROCEDURE — U0003 INFECTIOUS AGENT DETECTION BY NUCLEIC ACID (DNA OR RNA); SEVERE ACUTE RESPIRATORY SYNDROME CORONAVIRUS 2 (SARS-COV-2) (CORONAVIRUS DISEASE [COVID-19]), AMPLIFIED PROBE TECHNIQUE, MAKING USE OF HIGH THROUGHPUT TECHNOLOGIES AS DESCRIBED BY CMS-2020-01-R: HCPCS | Performed by: ORTHOPAEDIC SURGERY

## 2020-11-24 LAB — SARS-COV-2 RNA SPEC QL NAA+PROBE: NOT DETECTED

## 2020-11-30 ENCOUNTER — HOSPITAL ENCOUNTER (OUTPATIENT)
Facility: HOSPITAL | Age: 45
Setting detail: OUTPATIENT SURGERY
Discharge: HOME | End: 2020-11-30
Attending: ORTHOPAEDIC SURGERY | Admitting: ORTHOPAEDIC SURGERY
Payer: OTHER MISCELLANEOUS

## 2020-11-30 VITALS
HEART RATE: 79 BPM | SYSTOLIC BLOOD PRESSURE: 108 MMHG | TEMPERATURE: 97.2 F | DIASTOLIC BLOOD PRESSURE: 60 MMHG | OXYGEN SATURATION: 96 % | RESPIRATION RATE: 20 BRPM

## 2020-11-30 DIAGNOSIS — M67.80 ADHESIONS, FLEXOR OR EXTENSOR TENDONS: Primary | ICD-10-CM

## 2020-11-30 LAB
EXT PREGNANCY TEST URINE: NEGATIVE
EXT. CONTROL: NORMAL

## 2020-11-30 PROCEDURE — 81025 URINE PREGNANCY TEST: CPT | Performed by: ORTHOPAEDIC SURGERY

## 2020-11-30 PROCEDURE — 26442 RELEASE PALM & FINGER TENDON: CPT | Performed by: ORTHOPAEDIC SURGERY

## 2020-11-30 RX ORDER — CEFAZOLIN SODIUM 2 G/50ML
2000 SOLUTION INTRAVENOUS ONCE
Status: COMPLETED | OUTPATIENT
Start: 2020-11-30 | End: 2020-11-30

## 2020-11-30 RX ORDER — OXYCODONE HYDROCHLORIDE 5 MG/1
5 TABLET ORAL EVERY 4 HOURS PRN
Qty: 5 TABLET | Refills: 0 | Status: SHIPPED | OUTPATIENT
Start: 2020-11-30 | End: 2020-12-10

## 2020-11-30 RX ORDER — MAGNESIUM HYDROXIDE 1200 MG/15ML
LIQUID ORAL AS NEEDED
Status: DISCONTINUED | OUTPATIENT
Start: 2020-11-30 | End: 2020-11-30 | Stop reason: HOSPADM

## 2020-11-30 RX ORDER — OXYCODONE HYDROCHLORIDE 5 MG/1
5 TABLET ORAL EVERY 4 HOURS PRN
Status: DISCONTINUED | OUTPATIENT
Start: 2020-11-30 | End: 2020-11-30 | Stop reason: HOSPADM

## 2020-11-30 RX ORDER — LIDOCAINE HYDROCHLORIDE AND EPINEPHRINE 10; 10 MG/ML; UG/ML
20 INJECTION, SOLUTION INFILTRATION; PERINEURAL ONCE
Status: COMPLETED | OUTPATIENT
Start: 2020-11-30 | End: 2020-11-30

## 2020-11-30 RX ORDER — LIDOCAINE HYDROCHLORIDE AND EPINEPHRINE 10; 10 MG/ML; UG/ML
INJECTION, SOLUTION INFILTRATION; PERINEURAL AS NEEDED
Status: DISCONTINUED | OUTPATIENT
Start: 2020-11-30 | End: 2020-11-30 | Stop reason: HOSPADM

## 2020-11-30 RX ADMIN — CEFAZOLIN SODIUM 2000 MG: 2 SOLUTION INTRAVENOUS at 08:09

## 2020-12-10 ENCOUNTER — OFFICE VISIT (OUTPATIENT)
Dept: OBGYN CLINIC | Facility: MEDICAL CENTER | Age: 45
End: 2020-12-10

## 2020-12-10 VITALS
HEIGHT: 68 IN | TEMPERATURE: 98.1 F | HEART RATE: 73 BPM | SYSTOLIC BLOOD PRESSURE: 114 MMHG | DIASTOLIC BLOOD PRESSURE: 76 MMHG | BODY MASS INDEX: 21.22 KG/M2 | WEIGHT: 140 LBS

## 2020-12-10 DIAGNOSIS — M67.80 ADHESIONS, FLEXOR OR EXTENSOR TENDONS: Primary | ICD-10-CM

## 2020-12-10 DIAGNOSIS — Z47.89 AFTERCARE FOLLOWING SURGERY OF THE MUSCULOSKELETAL SYSTEM: ICD-10-CM

## 2020-12-10 PROCEDURE — 99024 POSTOP FOLLOW-UP VISIT: CPT | Performed by: ORTHOPAEDIC SURGERY

## 2021-01-05 ENCOUNTER — TELEPHONE (OUTPATIENT)
Dept: OBGYN CLINIC | Facility: HOSPITAL | Age: 46
End: 2021-01-05

## 2021-01-05 NOTE — TELEPHONE ENCOUNTER
Dr Shakila Oconnor  Re: Subha Hargrove   # [phone] [facility] ([address] [employer]) called asking if the office can fax over most recent Subha Hargrove from Dr Shakila Oconnor (12 [phone])    Fax # [phone]: Claim  Claim #  Deon Case

## 2021-01-07 ENCOUNTER — OFFICE VISIT (OUTPATIENT)
Dept: OBGYN CLINIC | Facility: MEDICAL CENTER | Age: 46
End: 2021-01-07

## 2021-01-07 VITALS
DIASTOLIC BLOOD PRESSURE: 67 MMHG | WEIGHT: 140 LBS | BODY MASS INDEX: 21.22 KG/M2 | HEIGHT: 68 IN | HEART RATE: 83 BPM | SYSTOLIC BLOOD PRESSURE: 104 MMHG

## 2021-01-07 DIAGNOSIS — M67.80 ADHESIONS, FLEXOR OR EXTENSOR TENDONS: Primary | ICD-10-CM

## 2021-01-07 PROCEDURE — 99024 POSTOP FOLLOW-UP VISIT: CPT | Performed by: ORTHOPAEDIC SURGERY

## 2021-01-07 NOTE — LETTER
January 7, 2021     Patient: Renetta Pino   YOB: 1975   Date of Visit: 1/7/2021       To Whom it May Concern:    Renetta Pino is under my professional care  She was seen in my office on 1/7/2021  She may return to work but with no use of the left hand  If you have any questions or concerns, please don't hesitate to call           Sincerely,          Lucia Steel MD        CC: No Recipients

## 2021-01-07 NOTE — PROGRESS NOTES
History of the Present Illness     Renetta Pino is a 54 y o  female presents 5 weeks post op flexor tenolysis of left ring finger in the finger palm and wrist on 11/30/2020  Patient states that she still has numbness and tingling along the incision site that she feels have diminished slightly  Patient states that she currently attends therapy three times a week with little improvement  Patient wears flexion and extension splints on her left ring finger multiple times a day to try and increase mobility  Patient states she takes tylenol every day for pain  Physical Exam     /67   Pulse 83   Ht 5' 8" (1 727 m)   Wt 63 5 kg (140 lb)   BMI 21 29 kg/m²     Left Hand:  Incision is well healed without swelling or erythema   No tenderness over incision  Flexion contracture at PIP joint, limited active flexion at DIP joints  [facility] is about 4-5 cm actively  Passively flexion of left ring finger limited to [facility] of 1 which was about her baseline preop  Decrease sensation over incision site   2 point discrimination: 11 radial ring finger   Brisk capillary refill       Data Review       Imaging:  None today     Assessment and Plan       54 y o  female 5 weeks post op flexor tendonlysis of left ring finger palm and wrist on 11/30/2020  I dicussed with her that in time the swelling should continue to improve although it is unlikely that the tendon will glide normally again  I discussed with her that she may get a second opinion if she would like  She may continue to attend formal physical therapy 1-2 x per week and continue her home exercises  I will see her back in the office in 2 months to re evaluate her left ring finger  Patient unable to use left hand until I see her next time for work  I will look into cosmetic non functional silicone fingers upon request of the patient             Follow Up: 2 months     To Do Next Visit: re check left ring finger     PROCEDURES PERFORMED:  No Procedures performed today        Scribe Attestation    I,:  Delia Salgado am acting as a scribe while in the presence of the attending physician :       I,:  Ambrosio Beaver MD personally performed the services described in this documentation    as scribed in my presence :

## 2021-01-08 NOTE — TELEPHONE ENCOUNTER
Dr Sheila Santos    Patient requested PT script to be faxed to [id number] [place] [address] # [phone]    Patient cb # [phone]

## 2021-03-11 ENCOUNTER — OFFICE VISIT (OUTPATIENT)
Dept: OBGYN CLINIC | Facility: MEDICAL CENTER | Age: 46
End: 2021-03-11
Payer: OTHER MISCELLANEOUS

## 2021-03-11 VITALS — HEIGHT: 68 IN | BODY MASS INDEX: 21.22 KG/M2 | WEIGHT: 140 LBS

## 2021-03-11 DIAGNOSIS — M67.80 ADHESIONS, FLEXOR OR EXTENSOR TENDONS: Primary | ICD-10-CM

## 2021-03-11 DIAGNOSIS — Z47.89 AFTERCARE FOLLOWING SURGERY OF THE MUSCULOSKELETAL SYSTEM: ICD-10-CM

## 2021-03-11 PROCEDURE — 99213 OFFICE O/P EST LOW 20 MIN: CPT | Performed by: ORTHOPAEDIC SURGERY

## 2021-03-11 NOTE — PROGRESS NOTES
Chief Complaint     Left ring finger stiffness     History of Present Illness     Rosalba Hammans is a 54 y o   female who presents the office today for follow-up evaluation status post flexor tenolysis left ring finger in the finger palm and wrist    This is a continuation of worker's compensation case  Patient states she continues to work with hand therapy but does have pain in the finger  as well as an increase in swelling since February  She denies any new incident of injury at that time  She does note she continues to have a very thick scar at the radial aspect her ring finger  Her sensation has improved to the ring finger  She states she has been wearing a brace from therapy but only at night  She has not been back to work as there is no job that she can do without use of her left hand  Past Medical History:   Diagnosis Date    Anxiety     Disease of thyroid gland     hypo    Irritable bowel syndrome     Kidney stone        Past Surgical History:   Procedure Laterality Date    FINGER AMPUTATION Left 4/27/2020    Procedure: REVISION AMPUTATION LONG/MIDDLE FINGER;  Surgeon: Rowena Davis MD;  [facility];  Service: Orthopedics    FRACTURE SURGERY      INCISION AND DRAINAGE OF WOUND Left 4/27/2020    Procedure: INCISION AND DRAINAGE (I&D) LEFT HAND;  Surgeon: oRwena Davis MD;  [facility];  Service: Orthopedics    ORIF FINGER FRACTURE Left 4/27/2020    Procedure: OPEN REDUCTION W/ INTERNAL FIXATION (ORIF)  RING FINGER PROXIMAL PHALANX;  Surgeon: Rowena Davis MD;  [facility];  Service: Orthopedics    TENDON RELEASE Left 11/30/2020    Procedure: Left ring finger flexor tenolysis; Surgeon:  Rowena Davis MD;  Location: [facility] MAIN OR;  Service: Orthopedics       No Known Allergies    Current Outpatient Medications on File Prior to Visit   Medication Sig Dispense Refill    citalopram (CeleXA) 20 mg tablet Take 20 mg by mouth daily      levothyroxine 50 mcg tablet Take 50 mcg by mouth daily       No current facility-administered medications on file prior to visit  Social History     Tobacco Use    Smoking status: Never Smoker    Smokeless tobacco: Never Used   Substance Use Topics    Alcohol use: Yes     Frequency: Monthly or less    Drug use: Never       Family History   Problem Relation Age of Onset    Cancer Mother        Review of Systems     As stated in the HPI  All other systems were reviewed and are negative  Physical Exam     Ht 5' 8" (1 727 m)   Wt 63 5 kg (140 lb)   BMI 21 29 kg/m²     GENERAL: This is a well-developed, well-nourished, age-appropriate patient in no acute distress  The patient is alert and oriented x3  Pleasant and cooperative  Eyes: Anicteric sclerae  Extraocular movements appear intact  HENT: Nares are patent with no drainage  Lungs: There is equal chest rise on inspection  Breathing is non-labored with no audible wheezing  Cardiovascular: No cyanosis  No upper extremity lymphadema  Skin: Skin is warm to touch  No obvious skin lesions or rashes other than described below  Neurologic: No ataxia  Psychiatric: Mood and affect are appropriate  Left ring finger   Skin intact, incisions well healed   No erythema or ecchymosis noted   Mild swelling noted   Thick scar at radial ring finger   independent flexion fdp ring   10 degrees of glide DIP of ring   30-90 degreed of PIP motion, MP motion 0-90 degrees    2 point discrimination is 5 to the ulnar and radial side of the ring    Brisk capillary refill noted    Data Review     Results Reviewed     None             Imaging:  None today     Assessment and Plan      Diagnoses and all orders for this visit:    Adhesions, flexor or extensor tendons  -     Ambulatory referral to PT/OT hand therapy; Future    Aftercare following surgery of the musculoskeletal system  -     Ambulatory referral to PT/OT hand therapy;  Future           74-year-old female status post above surgeries  Patient and I discussed that she does have a very thick scar at the ring finger which is likely limiting her motion  In the future we discussed possible Z plasty contracture release of the skin to allow her to achieve better motion  We discussed that the pain and swelling she is having about the ring finger are normal even a few months out of surgery  I do not believe that she is ready for surgery just yet would like her to continue to work on her motion with therapy  She may continue to use her splint as indicated  She is provided with a work note today stating that she may return to work with no use of her left hand  Also provided her with information about prostheses for her left middle finger  I will see her back in 6 weeks time for re-evaluation at that time we may discuss surgical intervention        Follow Up: 6 weeks     To Do Next Visit: re-evaluation     PROCEDURES PERFORMED:  Procedures  No Procedures performed today     Scribe Attestation    I,:  Jin Mercer MA am acting as a scribe while in the presence of the attending physician :       I,:  Rowena Davis MD personally performed the services described in this documentation    as scribed in my presence :

## 2021-03-11 NOTE — LETTER
March 11, 2021     Patient: Tori Poe   YOB: 1975   Date of Visit: 3/11/2021       To Whom it May Concern:    Tori Poe is under my professional care  She was seen in my office on 3/11/2021  She may return to work with no use of her left hand  She will be re-evaluated in 6 weeks time  She may require further surgical intervention in the future, which may extend her leave from work  If you have any questions or concerns, please don't hesitate to call           Sincerely,          Jeffrey Aguirre MD        CC: No Recipients

## 2021-03-12 ENCOUNTER — TELEPHONE (OUTPATIENT)
Dept: OBGYN CLINIC | Facility: HOSPITAL | Age: 46
End: 2021-03-12

## 2021-03-12 DIAGNOSIS — Z89.9 S/P AMPUTATION: Primary | ICD-10-CM

## 2021-03-12 NOTE — TELEPHONE ENCOUNTER
Patient sees Ariel Reid from the [facility] called, she needed patients address & , she needs to call the patient to help her with a prosthetic, she needed the office note also faxed over: [phone]

## 2021-03-12 NOTE — TELEPHONE ENCOUNTER
Dr Gareth Brambila    The patient contacted [facility] for the prosthetic finger, they are requesting a script       Please send to fax # [phone] # [phone]

## 2021-04-02 ENCOUNTER — TELEPHONE (OUTPATIENT)
Dept: OBGYN CLINIC | Facility: MEDICAL CENTER | Age: 46
End: 2021-04-02

## 2021-04-02 NOTE — TELEPHONE ENCOUNTER
Patient sees Dr Vera Dave at [facility] requesting work notes to be faxed to [phone] completed

## 2021-04-22 ENCOUNTER — OFFICE VISIT (OUTPATIENT)
Dept: OBGYN CLINIC | Facility: MEDICAL CENTER | Age: 46
End: 2021-04-22
Payer: OTHER MISCELLANEOUS

## 2021-04-22 VITALS
WEIGHT: 140 LBS | SYSTOLIC BLOOD PRESSURE: 126 MMHG | BODY MASS INDEX: 21.22 KG/M2 | HEART RATE: 86 BPM | HEIGHT: 68 IN | DIASTOLIC BLOOD PRESSURE: 76 MMHG

## 2021-04-22 DIAGNOSIS — S61.209D DEGLOVING INJURY OF FINGER, SUBSEQUENT ENCOUNTER: Primary | ICD-10-CM

## 2021-04-22 DIAGNOSIS — M67.80 ADHESIONS, FLEXOR OR EXTENSOR TENDONS: ICD-10-CM

## 2021-04-22 PROCEDURE — 99213 OFFICE O/P EST LOW 20 MIN: CPT | Performed by: ORTHOPAEDIC SURGERY

## 2021-04-22 NOTE — PROGRESS NOTES
Chief Complaint     Left ring finger stiffness      History of Present Illness     Thao Yepez is a 54 y o  female presenting for follow up evaluation s/p flexor tenolysis  Patient notes today she continues with stiffness in the ring finger  She has tried returning to the gym and has severe pain when she uses dumbbells  Her workers compensation group sent her for an independent medical evaluation for this with Dr Lilibeth Dsouza at [facility]  He suggested a surgical procedure but she cannot recall what it was exactly  Patient is currently out of work  This is a workers compensation case  Past Medical History:   Diagnosis Date    Anxiety     Disease of thyroid gland     hypo    Irritable bowel syndrome     Kidney stone        Past Surgical History:   Procedure Laterality Date    FINGER AMPUTATION Left 4/27/2020    Procedure: REVISION AMPUTATION LONG/MIDDLE FINGER;  Surgeon: Martha Ibrahim MD;  [facility];  Service: Orthopedics    FRACTURE SURGERY      INCISION AND DRAINAGE OF WOUND Left 4/27/2020    Procedure: INCISION AND DRAINAGE (I&D) LEFT HAND;  Surgeon: Martha Ibrahim MD;  [facility];  Service: Orthopedics    ORIF FINGER FRACTURE Left 4/27/2020    Procedure: OPEN REDUCTION W/ INTERNAL FIXATION (ORIF)  RING FINGER PROXIMAL PHALANX;  Surgeon: Martha Ibrahim MD;  [facility];  Service: Orthopedics    TENDON RELEASE Left 11/30/2020    Procedure: Left ring finger flexor tenolysis; Surgeon: Martha Ibrahim MD;  Location: [address] [place] [address] MAIN OR;  Service: Orthopedics       No Known Allergies    Current Outpatient Medications on File Prior to Visit   Medication Sig Dispense Refill    citalopram (CeleXA) 20 mg tablet Take 20 mg by mouth daily      levothyroxine 50 mcg tablet Take 50 mcg by mouth daily       No current facility-administered medications on file prior to visit          Social History     Tobacco Use    Smoking status: Never Smoker    Smokeless tobacco: Never Used   Substance Use Topics    Alcohol use: Yes     Frequency: Monthly or less    Drug use: Never       Family History   Problem Relation Age of Onset    Cancer Mother        Review of Systems     As stated in the HPI  All other systems were reviewed and are negative  Physical Exam     /76   Pulse 86   Ht 5' 8" (1 727 m)   Wt 63 5 kg (140 lb)   BMI 21 29 kg/m²     GENERAL: This is a well-developed, well-nourished, age-appropriate patient in no acute distress  The patient is alert and oriented x3  Pleasant and cooperative  Eyes: Anicteric sclerae  Extraocular movements appear intact  HENT: Nares are patent with no drainage  Lungs: There is equal chest rise on inspection  Breathing is non-labored with no audible wheezing  Cardiovascular: No cyanosis  No upper extremity lymphadema  Skin: Skin is warm to touch  No obvious skin lesions or rashes other than described below  Neurologic: No ataxia  Psychiatric: Mood and affect are appropriate  Left  hand   Skin intact, healed incision but thickened   No erythema  Tenderness at palmar metacarpal heads of middle and ring finger  Full extension of all other fingers  30-90 degrees PIP motion ring finger, very tight band of tissue from scar on the radial border of finger  Brisk capillary refill  Palpable distal radial pulse      Data Review     Labs:  None today    Electrodiagnostic Testing:  None today    Imaging:  None today    Assessment and Plan      Diagnoses and all orders for this visit:    Degloving injury of finger, subsequent encounter  -     Ambulatory referral to PT/OT hand therapy; Future    Adhesions, flexor or extensor tendons  -     Ambulatory referral to PT/OT hand therapy; Future       54year old female s/p above surgeries  We again discussed the Z lengthening procedure  Patient does not desire any surgery at this point but may consider this at a later date  She is in the process of having a prosthesis made for the finger    She was seen for an independent medical evaluation by Dr Prema Diego over at [facility]  I requested that she obtains notes from this visit or at least the procedure that he was recommending to get a better understanding of what was offered to her as this may be a viable option for her  She should follow up once this is completed and we can discuss surgical options further  She should continue with her previously stated work restrictions that she may return with no use of left hand  Note was provided today  She was also provided with updated therapy script to continue this         Follow Up: about 4 weeks    To Do Next Visit: repeat exam    PROCEDURES PERFORMED:    No Procedures performed today     Scribe Attestation    I,:  Soy Diallo MA am acting as a scribe while in the presence of the attending physician :       I,:  Radha Ayers MD personally performed the services described in this documentation    as scribed in my presence :

## 2021-04-22 NOTE — LETTER
April 22, 2021     Patient: Rosezena Alpers   YOB: 1975   Date of Visit: 4/22/2021       To Whom it May Concern:    Rosezena Alpers is under my professional care  She was seen in my office on 4/22/2021  She may return to work with no use of her left hand  She will be re-evaluated in about a month  She may require further surgical intervention in the future, which may extend her leave from work  If you have any questions or concerns, please don't hesitate to call           Sincerely,          Portia Nichole MD        CC: No Recipients

## 2021-05-11 ENCOUNTER — TELEPHONE (OUTPATIENT)
Dept: OBGYN CLINIC | Facility: HOSPITAL | Age: 46
End: 2021-05-11

## 2021-05-11 NOTE — TELEPHONE ENCOUNTER
Looks like an MARITZA is scanned in  Can you please review and see if you need more info from Dr Vince Newman?

## 2021-05-11 NOTE — TELEPHONE ENCOUNTER
Patient states WC can not get the information you requested  She is unable to get it atleast for 1-2 months  Do you want to see her on 5/13 or push it out

## 2021-05-13 ENCOUNTER — OFFICE VISIT (OUTPATIENT)
Dept: OBGYN CLINIC | Facility: MEDICAL CENTER | Age: 46
End: 2021-05-13
Payer: OTHER MISCELLANEOUS

## 2021-05-13 VITALS — BODY MASS INDEX: 20.16 KG/M2 | TEMPERATURE: 99 F | HEIGHT: 68 IN | WEIGHT: 133 LBS

## 2021-05-13 DIAGNOSIS — S61.209D DEGLOVING INJURY OF FINGER, SUBSEQUENT ENCOUNTER: Primary | ICD-10-CM

## 2021-05-13 DIAGNOSIS — M67.80 ADHESIONS, FLEXOR OR EXTENSOR TENDONS: ICD-10-CM

## 2021-05-13 DIAGNOSIS — S61.412D LACERATION OF LEFT HAND, FOREIGN BODY PRESENCE UNSPECIFIED, SUBSEQUENT ENCOUNTER: ICD-10-CM

## 2021-05-13 PROCEDURE — 99214 OFFICE O/P EST MOD 30 MIN: CPT | Performed by: ORTHOPAEDIC SURGERY

## 2021-05-13 NOTE — PROGRESS NOTES
Chief Complaint      left ring finger stiffness    History of Present Illness     Tutu Summers is a 54 y o   female who presents to the office today for a follow up evaluation s/p flexor tenolysis  This is a continuation of a workers compensation case  Patient was able to obtain information from Dr Shira Ash prior to today's visit  She states she continues to have stiffness about the ring finger but has made some progress in obtaining flexion of the finger in therapy  Patient states she would like to have the finger thenar so that she can get her wedding ring on  She also notes some pain in the scar that is on the radial border of the ring finger which precludes her from placing her wedding band on without pain  She reports no new injuries  Past Medical History:   Diagnosis Date    Anxiety     Disease of thyroid gland     hypo    Irritable bowel syndrome     Kidney stone        Past Surgical History:   Procedure Laterality Date    FINGER AMPUTATION Left 4/27/2020    Procedure: REVISION AMPUTATION LONG/MIDDLE FINGER;  Surgeon: Neida Rios MD;  [facility];  Service: Orthopedics    FRACTURE SURGERY      INCISION AND DRAINAGE OF WOUND Left 4/27/2020    Procedure: INCISION AND DRAINAGE (I&D) LEFT HAND;  Surgeon: Neida Rios MD;  [facility];  Service: Orthopedics    ORIF FINGER FRACTURE Left 4/27/2020    Procedure: OPEN REDUCTION W/ INTERNAL FIXATION (ORIF)  RING FINGER PROXIMAL PHALANX;  Surgeon: Neida Rios MD;  [facility];  Service: Orthopedics    TENDON RELEASE Left 11/30/2020    Procedure: Left ring finger flexor tenolysis; Surgeon:  Neida Rios MD;  Location: [address] [place] [address] MAIN OR;  Service: Orthopedics       No Known Allergies    Current Outpatient Medications on File Prior to Visit   Medication Sig Dispense Refill    citalopram (CeleXA) 20 mg tablet Take 20 mg by mouth daily      levothyroxine 50 mcg tablet Take 50 mcg by mouth daily       No current facility-administered medications on file prior to visit  Social History     Tobacco Use    Smoking status: Never Smoker    Smokeless tobacco: Never Used   Substance Use Topics    Alcohol use: Yes     Frequency: Monthly or less    Drug use: Never       Family History   Problem Relation Age of Onset    Cancer Mother        Review of Systems     As stated in the HPI  All other systems were reviewed and are negative  Physical Exam     Temp 99 °F (37 2 °C)   Ht 5' 8" (1 727 m)   Wt 60 3 kg (133 lb)   BMI 20 22 kg/m²     GENERAL: This is a well-developed, well-nourished, age-appropriate patient in no acute distress  The patient is alert and oriented x3  Pleasant and cooperative  Eyes: Anicteric sclerae  Extraocular movements appear intact  HENT: Nares are patent with no drainage  Lungs: There is equal chest rise on inspection  Breathing is non-labored with no audible wheezing  Cardiovascular: No cyanosis  No upper extremity lymphadema  Skin: Skin is warm to touch  No obvious skin lesions or rashes other than described below  Neurologic: No ataxia  Psychiatric: Mood and affect are appropriate  Left ring finger   Skin intact though with a taught scar on the radial palmar border of the ring finger that extends to just distal to the PIP joint    This does not appear to be precluding PIP motion in extension  No erythema or ecchymosis noted   Mild swelling noted   No bow stringing noted over the A2 pulley especially with resisted FDP function  I applied pressure over the A2 pulley as well to simulate a pulley ring and there was no increased excursion at the PIP or D IP with flexion  Range of motion: PIP 30-90 degrees, DIP 20-45 degrees  all measured with goniometer  She is about 1-2 cm off of her [facility] not measured with a ruler but this is precluded from full flexion by D IP stiffness  Sensation intact to the radial and ulnar aspect of the digit   Brisk capillary refill noted     Data Review Office notes from Dr Sherri Guadarrama were reviewed and discuss the concern for A2 pulley rupture and role of A2 pulley reconstruction    Assessment and Plan      Diagnoses and all orders for this visit:    Degloving injury of finger, subsequent encounter    Adhesions, flexor or extensor tendons    Laceration of left hand, foreign body presence unspecified, subsequent encounter         23-year-old female who has been through quite a bit after revision amputation of the left middle finger, fixation of the left proximal phalanx fracture, subsequent tenolysis for stiffness who has made excellent progress with therapy, has functional range of motion though is limited in terms of her terminal flexion and concern for painful scar in the webspace    I carefully evaluated today for A2 pulley rupture given this concern  I do not see concern for this on exam today and a simulated pulley ring did not improve her ability to flex the suggestive that pulley reconstruction, even if a pulley rupture were identified, would not necessarily bring significant improvement  Also, I had a discussion with her about her goals  Finger flexion has improved dramatically and this is not fully what she desires to improve upon  She is a little bit concerned about the scar that is a bit painful on the radial border of the proximal phalanx of the ring finger  I did discuss with her that the plasty can improve the length of this, but it would not necessarily improve the pain associated with her scar tissue and that desensitization would likely be better for this  I do think at this time that given her goals, no surgical intervention would be advisable  She does not wish to pursue a long-term recovery after any surgical procedure and even if A2 pulley reconstruction were undertaken, I do not believe that she would want to undergo this 3-6 month recovery associated with this  She notes this directly to me as well    I also do not believe that the soft tissue rearrangement procedure is going to make her much better and so I have advised no further surgical intervention  I believe that she has likely reached maximal medical improvement though likely she will require a formal rating  I believe that she is at peace with this decision and she has had quite a Journey and would like this to be over  She will certainly continue on her own self-directed therapy but I have advised that further in-person therapy may not be as indicated  I also discussed that were she to want to pursue further intervention for presumed A2 pulley rupture, we would likely perform an additional ultrasound to verify this before planning anything else but the patient does not wish to do this and we would get this imaging if she was not committed to having a surgical procedure  Overall, I am pleased with the amount of recovery she has attained from this terrible injury    Have advised her to call me with any questions in the future        Follow Up: PRN    To Do Next Visit:     PROCEDURES PERFORMED:  Procedures  No Procedures performed today     Scribe Attestation    I,:  Josh Thomas MA am acting as a scribe while in the presence of the attending physician :       I,:  Viviana Holt MD personally performed the services described in this documentation    as scribed in my presence :

## 2021-05-21 ENCOUNTER — TELEPHONE (OUTPATIENT)
Dept: OBGYN CLINIC | Facility: HOSPITAL | Age: 46
End: 2021-05-21

## 2021-05-21 NOTE — TELEPHONE ENCOUNTER
Chris Mcmullen, nurse  from [employer] is calling because she faxed something prior to pateint's last appt to [phone]  Chris Mcmullen would like someone to call her about the MARITZA report that patient was suppose to give to Dr Chayo Caruso on [id number]  Chris Mcmullen will also refax this information to [phone]  Chris Mcmullen would like to know if this patient is scheduled for sx  Chris Mcmullen is also asking that I fax the ovn from 5/13/21 to her at [phone]  It was faxed       cb [phone]

## 2023-10-30 NOTE — TELEPHONE ENCOUNTER
Nutrition Services Progress Note    Physician: Dr. Bazan  Diagnosis: Gastric Cancer  Treatment:  Docetaxel, Oxaliplatin, Leucovorin, Fluorouracil s/p 4 cycles    Food and Nutrition Related History:  Met with patient during treatment, she is requesting to review diet in relation to ongoing diarrhea.     Yesterday ate pork chops and spaghetti. Describes portions as child sized. Took 1 capsule pancreatic enzyme and independently verbalizes could have taken 2 capsules with recent meals. Takes capsule at start of meal. Reportedly  pancreatic enzyme script about 2 weeks ago (order written on 9/25/23) and has 2 large bottles left.     Patient feels Ensure consumption exacerbates diarrhea due to lactose content and inquires if there are alternative products.     Patient also asking for list of high potassium foods due to current hypokalemia.     Anthropometric Measurements:  Estimated body mass index is 15.65 kg/m² as calculated from the following:    Height as of 10/16/23: 5' 5\" (1.651 m).    Weight as of this encounter: 42.7 kg (94 lb 0.4 oz).    Wt Readings from Last 15 Encounters:   10/30/23 42.7 kg (94 lb 0.4 oz)   10/16/23 41.5 kg (91 lb 7.9 oz)   09/21/23 43 kg (94 lb 12.8 oz)   09/11/23 46.3 kg (102 lb)   09/11/23 46.6 kg (102 lb 11.8 oz)   09/04/23 49.5 kg (109 lb 2 oz)   08/28/23 49.5 kg (109 lb 2 oz)   07/27/23 47.5 kg (104 lb 12.8 oz)   07/11/23 46.2 kg (101 lb 13.6 oz)   06/27/23 48.1 kg (106 lb)   06/13/23 48.1 kg (106 lb 0.7 oz)   06/06/23 49.7 kg (109 lb 9.1 oz)   05/24/23 49.2 kg (108 lb 9.2 oz)   05/17/23 51.2 kg (112 lb 14.4 oz)   05/12/23 51.1 kg (112 lb 10.5 oz)     Usual Weight:  150-155 lbs (1/2023); 109 lbs/49.7kg (August 2023)  Weight Change: Net wt loss of 13.5% (6.7 kg) x 1 month post operative. Wt stable x 1 month. BMI indicative of underweight status.     Biochemical Data, Medical Tests, and Procedures:  -10/30 during today's clinic visit patient able to provide a stool sample    -  Ryder Lizama, can you please fax the most recent office note to her   All of this is explained in the note 10/22 ED related to abdominal pain, nausea, emesis, diarrhea. Patient again unable to provided stool sample.    - 9/22 Team recommending stool study regarding diarrhea, patient reportedly unable to provide sample    - 8/23/23  Ex lap, subtotal gastrectomy, omentectomy, narda en y gastrojejunostomy by Dr. Villalta. We then chose a transection point on the jejunum distal to the ligament of Treitz, about 20 cm and brought the distal loop in a retrocolic fashion to perform a Narda-en-Y gastrojejunostomy.  Discharged on low fiber diet, miralax while on pain medications.    Nutrition-Focused Physical Findings:  Global moderate to severe muscle wasting, weakness and fatigue reported.     Comparative Standards:  Estimated energy needs -  Total energy estimated needs (CS-1.1.1):   Calculation Weight: 46.2 kg  Calorie needs: 1600- 1850 (35-40 kcal/kg)  Protein needs: 70g (1.5 g/kg)    Nutrition Diagnosis:  Severe Protein Calorie Malnutrition related to poor appetite, odynophagia and increased estimated nutritional needs as evidenced by >30% wt loss in less than 1 year with more acute wt loss 13.5% (6.7 kg) x 1 month, meeting </=75% of estimated energy expenditure for >/=1 month, BMI indicative of underweight status, global moderate to severe muscle wasting and fat loss.       Intervention:  Nutrition relationship to health/disease:       - Education previously provided for post gastrectomy diet- handout provided and reviewed. Set goals including aiming for at least 4 meals/day, incorporating liquids between meals, mixing Ensure Plus into hot chocolate or cereals or using flavored syrups.     - Provided list of high potassium foods that would also be suitable for altered GI function.     Commercial beverage:     - Standard oral nutrition supplement 1 bottle (8 oz) upto tid, each to provide 350 calories to optimize wt status. Reminded patient Ensure product is suitable for lactose intolerance. Provided by [employer] [facility].    --This date provided trial of Percello Plant Protein Shake and Vitals (vitals.com) Standard 1.0 and explained this are not only suitable for lactose intolerances but are dairy free.     Medications:     - Ongoing discussions to review in detail the purpose of pancreatic enzyme, benefits of compliance and consequences of non- compliance. Strongly encouraged to resume taking. Will await stool study results and re-evaluate need to adjust script.     RD contact information provided.    Monitoring and Evaluation:  Total energy intake:  Goal: meet estimated nutritional needs to optimize weight status post op- not met, education provided

## 2024-12-13 ENCOUNTER — ANESTHESIA EVENT (OUTPATIENT)
Dept: GASTROENTEROLOGY | Facility: HOSPITAL | Age: 49
End: 2024-12-13
Payer: COMMERCIAL

## 2024-12-13 ENCOUNTER — HOSPITAL ENCOUNTER (OUTPATIENT)
Dept: GASTROENTEROLOGY | Facility: HOSPITAL | Age: 49
Setting detail: OUTPATIENT SURGERY
End: 2024-12-13
Attending: HOSPITALIST
Payer: COMMERCIAL

## 2024-12-13 ENCOUNTER — ANESTHESIA (OUTPATIENT)
Dept: GASTROENTEROLOGY | Facility: HOSPITAL | Age: 49
End: 2024-12-13
Payer: COMMERCIAL

## 2024-12-13 VITALS
HEIGHT: 68 IN | HEART RATE: 62 BPM | TEMPERATURE: 97.5 F | DIASTOLIC BLOOD PRESSURE: 66 MMHG | OXYGEN SATURATION: 99 % | RESPIRATION RATE: 20 BRPM | WEIGHT: 140 LBS | SYSTOLIC BLOOD PRESSURE: 113 MMHG | BODY MASS INDEX: 21.22 KG/M2

## 2024-12-13 DIAGNOSIS — Z12.11 ENCOUNTER FOR SCREENING FOR MALIGNANT NEOPLASM OF COLON: ICD-10-CM

## 2024-12-13 PROBLEM — E03.9 HYPOTHYROIDISM: Status: ACTIVE | Noted: 2024-12-13

## 2024-12-13 LAB
EXT PREGNANCY TEST URINE: NEGATIVE
EXT. CONTROL: NORMAL

## 2024-12-13 PROCEDURE — 88305 TISSUE EXAM BY PATHOLOGIST: CPT | Performed by: STUDENT IN AN ORGANIZED HEALTH CARE EDUCATION/TRAINING PROGRAM

## 2024-12-13 PROCEDURE — 81025 URINE PREGNANCY TEST: CPT | Performed by: HOSPITALIST

## 2024-12-13 RX ORDER — SODIUM CHLORIDE, SODIUM LACTATE, POTASSIUM CHLORIDE, CALCIUM CHLORIDE 600; 310; 30; 20 MG/100ML; MG/100ML; MG/100ML; MG/100ML
INJECTION, SOLUTION INTRAVENOUS CONTINUOUS PRN
Status: DISCONTINUED | OUTPATIENT
Start: 2024-12-13 | End: 2024-12-13

## 2024-12-13 RX ORDER — PROPOFOL 10 MG/ML
INJECTION, EMULSION INTRAVENOUS AS NEEDED
Status: DISCONTINUED | OUTPATIENT
Start: 2024-12-13 | End: 2024-12-13

## 2024-12-13 RX ORDER — SODIUM CHLORIDE, SODIUM LACTATE, POTASSIUM CHLORIDE, CALCIUM CHLORIDE 600; 310; 30; 20 MG/100ML; MG/100ML; MG/100ML; MG/100ML
50 INJECTION, SOLUTION INTRAVENOUS CONTINUOUS
Status: CANCELLED | OUTPATIENT
Start: 2024-12-13

## 2024-12-13 RX ORDER — LIDOCAINE HYDROCHLORIDE 10 MG/ML
INJECTION, SOLUTION EPIDURAL; INFILTRATION; INTRACAUDAL; PERINEURAL AS NEEDED
Status: DISCONTINUED | OUTPATIENT
Start: 2024-12-13 | End: 2024-12-13

## 2024-12-13 RX ADMIN — LIDOCAINE HYDROCHLORIDE 50 MG: 10 INJECTION, SOLUTION EPIDURAL; INFILTRATION; INTRACAUDAL; PERINEURAL at 10:23

## 2024-12-13 RX ADMIN — SODIUM CHLORIDE, SODIUM LACTATE, POTASSIUM CHLORIDE, AND CALCIUM CHLORIDE: .6; .31; .03; .02 INJECTION, SOLUTION INTRAVENOUS at 10:06

## 2024-12-13 RX ADMIN — PROPOFOL 50 MG: 10 INJECTION, EMULSION INTRAVENOUS at 10:26

## 2024-12-13 RX ADMIN — PROPOFOL 100 MG: 10 INJECTION, EMULSION INTRAVENOUS at 10:23

## 2024-12-13 RX ADMIN — PROPOFOL 140 MCG/KG/MIN: 10 INJECTION, EMULSION INTRAVENOUS at 10:24

## 2024-12-13 NOTE — H&P
Procedure(s):  Colonoscopy with indication(s) of   CRC screening      Vitals:    12/13/24 0913   Pulse: 67   Resp: 18   Temp: (!) 97.3 °F (36.3 °C)   SpO2: 99%       Physical Exam:  Physical Exam  Eyes:      Conjunctiva/sclera: Conjunctivae normal.   Cardiovascular:      Rate and Rhythm: Normal rate.   Pulmonary:      Effort: Pulmonary effort is normal.   Abdominal:      Palpations: Abdomen is soft.   Neurological:      General: No focal deficit present.      Mental Status: She is alert.   Psychiatric:         Mood and Affect: Mood normal.              Endoscopy Pre-Procedure Assessment:  Prior to the procedure, the patient is identified.  The patient's history, medications, and allergies have been reviewed.  The patient is competent.     Consent:    We have discussed the procedure in detail. We reviewed risks, benefits and alternative as well as potentional complications including and not limited to medication side effect , infection, bleeding, perforation and the potential need for surgery, ICU admission, CPR, as well as the need for blood product transfusion. Patient verbalized understanding and agreement. All patient questions were answered.     After reviewed the risks and benefits, the patient is deemed in satisfactory condition to undergo the procedure.  The anesthesia plan is to use monitored anesthesia care (MAC).      12/13/24

## 2024-12-13 NOTE — ANESTHESIA POSTPROCEDURE EVALUATION
Post-Op Assessment Note    CV Status:  Stable  Pain Score: 0    Pain management: adequate       Mental Status:  Alert and awake   Hydration Status:  Euvolemic   PONV Controlled:  Controlled   Airway Patency:  Patent     Post Op Vitals Reviewed: Yes    No anethesia notable event occurred.    Staff: CRNA           Last Filed PACU Vitals:  Vitals Value Taken Time   Temp 97 °F (36.1 °C) 12/13/24 1047   Pulse 82 12/13/24 1048   /59 12/13/24 1048   Resp 11 12/13/24 1048   SpO2 94 % 12/13/24 1048   Vitals shown include unfiled device data.    Modified Von:  Activity: 2 (12/13/2024  9:15 AM)  Respiration: 2 (12/13/2024  9:15 AM)  Circulation: 2 (12/13/2024  9:15 AM)  Consciousness: 2 (12/13/2024  9:15 AM)  Oxygen Saturation: 2 (12/13/2024  9:15 AM)  Modified Von Score: 10 (12/13/2024  9:15 AM)

## 2024-12-13 NOTE — ANESTHESIA PREPROCEDURE EVALUATION
Procedure:  COLONOSCOPY    Relevant Problems   ANESTHESIA (within normal limits)      CARDIO (within normal limits)      ENDO   (+) Hypothyroidism      /RENAL (within normal limits)      HEMATOLOGY (within normal limits)      MUSCULOSKELETAL (within normal limits)      NEURO/PSYCH   (+) Anxiety      PULMONARY (within normal limits)        Physical Exam    Airway    Mallampati score: II  TM Distance: >3 FB  Neck ROM: full     Dental   No notable dental hx     Cardiovascular  Cardiovascular exam normal    Pulmonary  Pulmonary exam normal     Other Findings  post-pubertal.      Anesthesia Plan  ASA Score- 2     Anesthesia Type- IV sedation with anesthesia with ASA Monitors.         Additional Monitors:     Airway Plan:            Plan Factors-Exercise tolerance (METS): >4 METS.    Chart reviewed. EKG reviewed. Imaging results reviewed. Existing labs reviewed. Patient summary reviewed.    Patient is not a current smoker.      Obstructive sleep apnea risk education given perioperatively.        Induction- intravenous.    Postoperative Plan-         Informed Consent- Anesthetic plan and risks discussed with patient.  I personally reviewed this patient with the CRNA. Discussed and agreed on the Anesthesia Plan with the CRNA..

## 2024-12-18 ENCOUNTER — OFFICE VISIT (OUTPATIENT)
Dept: FAMILY MEDICINE CLINIC | Facility: CLINIC | Age: 49
End: 2024-12-18
Payer: COMMERCIAL

## 2024-12-18 VITALS
BODY MASS INDEX: 21.82 KG/M2 | DIASTOLIC BLOOD PRESSURE: 73 MMHG | WEIGHT: 143.96 LBS | SYSTOLIC BLOOD PRESSURE: 109 MMHG | OXYGEN SATURATION: 98 % | HEART RATE: 90 BPM | HEIGHT: 68 IN

## 2024-12-18 DIAGNOSIS — K58.0 IRRITABLE BOWEL SYNDROME WITH DIARRHEA: ICD-10-CM

## 2024-12-18 DIAGNOSIS — M22.2X1 PATELLOFEMORAL PAIN SYNDROME OF BOTH KNEES: ICD-10-CM

## 2024-12-18 DIAGNOSIS — M25.561 CHRONIC PAIN OF BOTH KNEES: ICD-10-CM

## 2024-12-18 DIAGNOSIS — F41.9 ANXIETY: ICD-10-CM

## 2024-12-18 DIAGNOSIS — G89.29 CHRONIC PAIN OF BOTH KNEES: ICD-10-CM

## 2024-12-18 DIAGNOSIS — E03.9 HYPOTHYROIDISM, UNSPECIFIED TYPE: ICD-10-CM

## 2024-12-18 DIAGNOSIS — M25.562 CHRONIC PAIN OF BOTH KNEES: ICD-10-CM

## 2024-12-18 DIAGNOSIS — Z00.00 ANNUAL PHYSICAL EXAM: Primary | ICD-10-CM

## 2024-12-18 DIAGNOSIS — E53.8 VITAMIN B12 DEFICIENCY: ICD-10-CM

## 2024-12-18 DIAGNOSIS — E55.9 VITAMIN D DEFICIENCY: ICD-10-CM

## 2024-12-18 DIAGNOSIS — M22.2X2 PATELLOFEMORAL PAIN SYNDROME OF BOTH KNEES: ICD-10-CM

## 2024-12-18 PROCEDURE — 99386 PREV VISIT NEW AGE 40-64: CPT | Performed by: FAMILY MEDICINE

## 2024-12-18 PROCEDURE — 99213 OFFICE O/P EST LOW 20 MIN: CPT | Performed by: FAMILY MEDICINE

## 2024-12-18 PROCEDURE — 88305 TISSUE EXAM BY PATHOLOGIST: CPT | Performed by: STUDENT IN AN ORGANIZED HEALTH CARE EDUCATION/TRAINING PROGRAM

## 2024-12-18 NOTE — PROGRESS NOTES
Adult Annual Physical  Name: Yasemin Ha      : 1975      MRN: [id number]  Encounter Provider: Cary Beckham DO  Encounter Date: 2024   Encounter department: [facility]    Assessment & Plan  Annual physical exam         Irritable bowel syndrome with diarrhea  Chronic, effecting QOL  Discussed importance of dietary mgmt and provided handouts for this  Refer to GI for further evaluation if symptoms not improving with diet alone    Orders:    Ambulatory Referral to Gastroenterology; Future    Chronic pain of both knees  Suspect PFPS  Recommend prn Voltaren gel and PT to strengthen the support for the knees  Follow up in 6 weeks to reassess    Orders:    Ambulatory Referral to Physical Therapy; Future    Patellofemoral pain syndrome of both knees    Orders:    Ambulatory Referral to Physical Therapy; Future    Hypothyroidism, unspecified type  Chronic, stable  Patient had recent labs but these are not available to view results today  Will obtain copies and continue on levothyroxine 50mcg daily    Orders:    Ambulatory Referral to Nutrition Services; Future    Anxiety  Chronic, stable  Continue celexa 20mg daily  Orders:    Ambulatory Referral to Nutrition Services; Future    Vitamin D deficiency  Chronic, Stable  Patient had recent labs from her prior PCP but these are not available for my review today  Will obtain copies and continue to monitor       Vitamin B12 deficiency  Chronic, Stable  Patient had recent labs from her prior PCP but these are not available for my review today  Will obtain copies and continue to monitor       Immunizations and preventive care screenings were discussed with patient today. Appropriate education was printed on patient's after visit summary.        Depression Screening and Follow-up Plan: Patient was screened for depression during today's encounter. They screened negative with a PHQ-2 score of 0.      Return in about 6 weeks  (around 1/29/2025) for Recheck arthritis .    History of Present Illness     Chief Complaint   Patient presents with    New Patient Visit     Yaima stewart, Patient will like to talk about IVS,      Prior PCP: Dr. Vora  PMH: Hypothyroidism, anxiety, history of left third digit amputation (work injury), vitamin D deficiency, vitamin B12 deficiency, osteoarthritis, nephrolithiasis  SurgHx: Basal cell skin cancer removal, left finger surgery, stone removal  Allergies: none  Medications:   FamHx: Mother-renal carcinoma 60s, melanoma  SocialHx:   Tobacco: None   Alcohol: Social, 2/month on average   Relationship:    Career: Former   Concerns - IBSD, knee pain     Adult Annual Physical:  Patient presents for annual physical.     Diet and Physical Activity:  - Diet/Nutrition: well balanced diet.  - Exercise: strength training exercises, moderate cardiovascular exercise, 30-60 minutes on average and 3-4 times a week on average.    Depression Screening:  - PHQ-2 Score: 0    General Health:  - Sleep: 7-8 hours of sleep on average.  - Hearing: decreased hearing bilateral ears. not requiring aids  - Vision: wears glasses and goes for regular eye exams.  - Dental: regular dental visits.    /GYN Health:  - Follows with GYN: yes.   - Menopause: premenopausal.   - History of STDs: no  - Contraception: menopause. Sharonaswmilena      Review of Systems   Constitutional:  Negative for activity change, appetite change, chills, diaphoresis, fever and unexpected weight change.   HENT:  Negative for congestion, rhinorrhea, sore throat and trouble swallowing.    Eyes:  Negative for visual disturbance.   Respiratory:  Negative for cough, shortness of breath and wheezing.    Cardiovascular:  Negative for chest pain, palpitations and leg swelling.   Gastrointestinal:  Positive for diarrhea. Negative for abdominal pain, blood in stool and constipation.   Genitourinary:  Negative for difficulty urinating, dysuria, frequency and  "hematuria.   Musculoskeletal:  Positive for arthralgias. Negative for back pain and joint swelling.   Skin:  Negative for color change and rash.   Neurological:  Negative for dizziness, light-headedness and headaches.   Psychiatric/Behavioral:  Negative for dysphoric mood. The patient is not nervous/anxious.      Current Outpatient Medications on File Prior to Visit   Medication Sig Dispense Refill    citalopram (CeleXA) 20 mg tablet Take 20 mg by mouth daily      levothyroxine 50 mcg tablet Take 50 mcg by mouth daily      valACYclovir (VALTREX) 1,000 mg tablet TAKE 2 TABS BY MOUTH EVERY 12 HOURS FOR 2 DOSES AS DIRECTED      [DISCONTINUED] acetaminophen (TYLENOL) 500 mg tablet Take by mouth (Patient not taking: Reported on 12/18/2024)       No current facility-administered medications on file prior to visit.      Social History     Tobacco Use    Smoking status: Never    Smokeless tobacco: Never   Vaping Use    Vaping status: Never Used   Substance and Sexual Activity    Alcohol use: Yes    Drug use: Never    Sexual activity: Not Currently       Objective   /73 (BP Location: Right arm, Patient Position: Sitting, Cuff Size: Standard)   Pulse 90   Ht 5' 8\" (1.727 m)   Wt 65.3 kg (143 lb 15.4 oz)   LMP 10/16/2024 (Approximate)   SpO2 98%   BMI 21.89 kg/m²     Physical Exam  Vitals reviewed.   Constitutional:       General: She is not in acute distress.     Appearance: She is normal weight. She is not ill-appearing.   HENT:      Head: Normocephalic and atraumatic.      Right Ear: External ear normal.      Left Ear: External ear normal.   Eyes:      Extraocular Movements: Extraocular movements intact.      Conjunctiva/sclera: Conjunctivae normal.   Cardiovascular:      Rate and Rhythm: Normal rate and regular rhythm.      Heart sounds: Normal heart sounds.   Pulmonary:      Effort: Pulmonary effort is normal.      Breath sounds: Normal breath sounds.   Abdominal:      General: Abdomen is flat. Bowel sounds " are normal.      Palpations: Abdomen is soft.   Musculoskeletal:      Cervical back: Neck supple.      Right lower leg: No edema.      Left lower leg: No edema.      Comments: Positive patellar grind bilateral knees  No medial or lateral JL tenderness     Skin:     General: Skin is warm.   Neurological:      General: No focal deficit present.      Mental Status: She is alert.   Psychiatric:         Mood and Affect: Mood normal.         Behavior: Behavior normal.

## 2024-12-18 NOTE — ASSESSMENT & PLAN NOTE
Chronic, stable  Continue celexa 20mg daily  Orders:    Ambulatory Referral to Nutrition Services; Future

## 2024-12-18 NOTE — ASSESSMENT & PLAN NOTE
Chronic, stable  Patient had recent labs but these are not available to view results today  Will obtain copies and continue on levothyroxine 50mcg daily    Orders:    Ambulatory Referral to Nutrition Services; Future

## 2024-12-18 NOTE — ASSESSMENT & PLAN NOTE
Chronic, Stable  Patient had recent labs from her prior PCP but these are not available for my review today  Will obtain copies and continue to monitor

## 2024-12-19 ENCOUNTER — TELEPHONE (OUTPATIENT)
Dept: ADMINISTRATIVE | Facility: OTHER | Age: 49
End: 2024-12-19

## 2024-12-19 NOTE — TELEPHONE ENCOUNTER
----- Message from Nessa HSIEH sent at 12/18/2024  7:30 AM EST -----  Regarding: Care Gap Request Pap Smear HPV  12/18/24 7:30 AM    Hello, our patient attached above has had Pap Smear (HPV) aka Cervical Cancer Screening completed/performed. Please assist in updating the patient chart by pulling the Care Everywhere (CE) document. The date of service is 6/25/2024.     Thank you,  Nessa Tellez  [facility]

## 2024-12-19 NOTE — TELEPHONE ENCOUNTER
Upon review of the In Basket request we were able to locate, review, and update the patient chart as requested for Pap Smear (HPV) aka Cervical Cancer Screening.    Any additional questions or concerns should be emailed to the Practice Liaisons via the appropriate education email address, please do not reply via In Basket.    Thank you  Coleen Mackay MA   [facility]

## 2025-01-20 DIAGNOSIS — E03.9 HYPOTHYROIDISM, UNSPECIFIED TYPE: Primary | ICD-10-CM

## 2025-01-20 NOTE — TELEPHONE ENCOUNTER
Reason for call:   [x] Refill   [] Prior Auth  [] Other:     Office:   [x] PCP/Provider -   [] Specialty/Provider -     Medication: levothyroxine 50 mcg tablet Take 50 mcg by mouth daily,       Pharmacy: [id number] - [place] - [address]      Does the patient have enough for 3 days?   [x] Yes   [] No - Send as HP to POD

## 2025-01-21 RX ORDER — LEVOTHYROXINE SODIUM 50 UG/1
50 TABLET ORAL DAILY
Qty: 90 TABLET | Refills: 1 | Status: SHIPPED | OUTPATIENT
Start: 2025-01-21

## 2025-02-04 ENCOUNTER — EVALUATION (OUTPATIENT)
Dept: PHYSICAL THERAPY | Facility: CLINIC | Age: 50
End: 2025-02-04
Payer: COMMERCIAL

## 2025-02-04 DIAGNOSIS — M22.2X1 PATELLOFEMORAL PAIN SYNDROME OF BOTH KNEES: ICD-10-CM

## 2025-02-04 DIAGNOSIS — M22.2X2 PATELLOFEMORAL PAIN SYNDROME OF BOTH KNEES: ICD-10-CM

## 2025-02-04 DIAGNOSIS — M25.561 CHRONIC PAIN OF BOTH KNEES: ICD-10-CM

## 2025-02-04 DIAGNOSIS — G89.29 CHRONIC PAIN OF BOTH KNEES: ICD-10-CM

## 2025-02-04 DIAGNOSIS — M25.562 CHRONIC PAIN OF BOTH KNEES: ICD-10-CM

## 2025-02-04 PROCEDURE — 97530 THERAPEUTIC ACTIVITIES: CPT | Performed by: PHYSICAL THERAPIST

## 2025-02-04 PROCEDURE — 97161 PT EVAL LOW COMPLEX 20 MIN: CPT | Performed by: PHYSICAL THERAPIST

## 2025-02-04 PROCEDURE — 97110 THERAPEUTIC EXERCISES: CPT | Performed by: PHYSICAL THERAPIST

## 2025-02-04 NOTE — PROGRESS NOTES
PT Evaluation     Today's date: 2025  Patient name: Yasemin Ha  : 1975  MRN: [id number]  Referring provider: Cary Xiong DO  Dx:   Encounter Diagnosis     ICD-10-CM    1. Chronic pain of both knees  M25.561 Ambulatory Referral to Physical Therapy    M25.562     G89.29       2. Patellofemoral pain syndrome of both knees  M22.2X1 Ambulatory Referral to Physical Therapy    M22.2X2                      Assessment  Impairments: abnormal or restricted ROM, activity intolerance, lacks appropriate home exercise program and pain with function  Symptom irritability: low    Assessment details:   Patient is a  49 y.o. female presents to PT with a chronic history of L knee pain that is limiting her tolerance to exercise and activity.  Patient does demonstrate mild left lateral patellar tracking as well as positive grind test of B patellofemoral joints.  She  is expected to respond well to PT intervention at this time with focus on building and progressing HEP over 3-4 visits then d/c.    Understanding of Dx/Px/POC: excellent     Prognosis: excellent    Goals  STG 4 weeks    Patient to be independent with initial HEP for strengthening.  Patient able to ascend/ descend stairs with minimal to no pain complaints.  Patient to report decreased pain at worst with activity to  4/10 SPR      LTG 8 weeks  Patient able to ambulate overall all terrain without LOB or pain increase in the knee.  Patient able to return to hobby of dancing, walking, and working out with minimal to no left knee pain complaints.  Patient to be independent with final HEP.     Plan  Patient would benefit from: PT eval and skilled physical therapy  Planned modality interventions: cryotherapy, TENS and thermotherapy: hydrocollator packs    Planned therapy interventions: manual therapy, neuromuscular re-education, therapeutic activities, therapeutic exercise and home exercise program    Duration in weeks: 8  Plan of Care beginning date:  2025  Plan of Care expiration date: 2025  Treatment plan discussed with: patient and family  Plan details: Patient's evaluation is completed. Patient was instructed with a HEP this date. Patient has scheduled further PT sessions for treatment.          Subjective Evaluation    History of Present Illness  Mechanism of injury: Patient notes onset of left knee pain about 5 years ago. Has historically been a runner and jogger. Her work duties did result pressure on the left knee. She notes she did try exercise classes that increased knee pain. Use of stairs in increases pain as well as running or jogging increase pain. She notes she has not been completing regular exercise for the past few months due to left knee pain.    Patient Goals  Patient goals for therapy: decreased pain, increased strength and increased motion    Pain  Current pain ratin  At worst pain ratin  Location: with increased actiivty pain can reach 8/10  Quality: dull ache  Relieving factors: heat, ice and medications    Social Support  Steps to enter house: yes  Stairs in house: yes   Lives in: one-story house          Objective     Observations     Additional Observation Details  Normal standing posture  Able to complete single leg balance 10 seconds  No tenderness noted with palpation  No swelling present    Active Range of Motion   Left Knee   Flexion: 140 degrees   Extension: 0 degrees     Right Knee   Flexion: 140 degrees   Extension: 0 degrees     Strength/Myotome Testing     Left Knee   Normal strength    Right Knee   Normal strength    Tests     Left Knee   Positive patellar compression and patella-femoral grind.     Additional Tests Details  Positive for mild lateral patellar tracking             Precautions: None     Daily Treatment Diary:      Initial Evaluation Date: 25  Compliance                      Visit Number 1                    Re-Eval  IE                 MC   Foto Captured Y                                "                 Manual                                                                                        Ther-Ex                      QS 5\" x15                     SAQ 2x10                     SLR 2x10                     SLR with add 2x10                     HL adduction 5\" x10                     HL add with bridge 5\" x10                     LAQ 5\"x10                     Fwd/ lateral retro lunge  start           Mini squats  start           Step ups/ step down  start           Good mornings   start                   Heel raises  start           Leg press  start           TKE standing with TB  start                                 Nu-step   start                   Neuro Re-Ed                                                                                                Ther-Act              pt ed Pathology/ goals/ HEP 8 min                                                Modalities                      HP/CP prn                                                              "

## 2025-02-06 NOTE — PROGRESS NOTES
" Nutrition Assessment Form    Patient Name: Yasemin Ha    YOB: 1975    Sex: Female     Assessment Date: 2/6/2025  Start Time: 12:50 PM Stop Time: 2:04 PM Total Minutes: 74 min     Data:  Present at session: self   Parent/Patient Concerns/reason for visit: \"I want you to teach me to eat healthy.\"    Medical Dx/Reason for Referral: Hypothyroidism, anxiety   Past Medical History:   Diagnosis Date    Anxiety     Disease of thyroid gland     hypo    Irritable bowel syndrome     Kidney stone         IBS-D effecting QoL, referred to GI  Chronic pain of b/l knees, referred to PT  Hypothyroidism stable +levothyroxine 50 mcg  Anxiety on celexa 20 mg daily  Vitamin B12 deficiency chronic   Current Outpatient Medications   Medication Sig Dispense Refill    citalopram (CeleXA) 20 mg tablet Take 20 mg by mouth daily      levothyroxine 50 mcg tablet Take 1 tablet (50 mcg total) by mouth daily 90 tablet 1    valACYclovir (VALTREX) 1,000 mg tablet TAKE 2 TABS BY MOUTH EVERY 12 HOURS FOR 2 DOSES AS DIRECTED       No current facility-administered medications for this visit.     Valtrex PRN       Additional Meds/Supplements: MVI and fiber supplement, takes these with levothyroxine in the AM    Special Learning Needs/barriers to learning/any new barriers Was taking an herbal supplement for bloating, reports bloating every time she eats.    Height: Ht Readings from Last 5 Encounters:   12/18/24 5' 8\" (1.727 m)   12/13/24 5' 8\" (1.727 m)   12/02/24 5' 8\" (1.727 m)   05/13/21 5' 8\" (1.727 m)   04/22/21 5' 8\" (1.727 m)      Weight: Wt Readings from Last 10 Encounters:   12/18/24 65.3 kg (143 lb 15.4 oz)   12/13/24 63.5 kg (140 lb)   12/02/24 63.5 kg (140 lb)   05/13/21 60.3 kg (133 lb)   04/22/21 63.5 kg (140 lb)   03/11/21 63.5 kg (140 lb)   01/07/21 63.5 kg (140 lb)   12/10/20 63.5 kg (140 lb)   11/11/20 63.5 kg (140 lb)   09/17/20 62.6 kg (138 lb)     Estimated body mass index is 21.89 kg/m² as calculated from the " "following:    Height as of 12/18/24: 5' 8\" (1.727 m).    Weight as of 12/18/24: 65.3 kg (143 lb 15.4 oz).   Recent Weight Change: [x]Yes     []No  Amount: Reports previously weighing 130lb, undesired weight gain.       Energy Needs: [place]- StErnie Pederson Equation: 1760 - 2031 kcal (mifflin x 1.3 - 1.5 activity)   No Known Allergies or intolerances NKFA    Notices severe diarrhea with takeout/greasy foods  Dislikes seafood/fish     Social History     Substance and Sexual Activity   Alcohol Use Yes    White [place]ian perhaps once per month    Social History     Tobacco Use   Smoking Status Never   Smokeless Tobacco Never       Who shops? patient   Who cooks/cooking methods/Eating out/take out habits   patient--\"I hate to cook\"   Cooking methods: bake/sargent/air sargent/grill/boil--variety of methods    Take out: n/a  Dining out once per week, decreases this \"when I'm on dieting\".    Exercise: Stopped going to the gym due to R knee arthritis/pain     Work related injury with her L hand \"I'm still waiting on the settlement\" from 5 years ago.      Other: ie: Sleep habits/ stress level/ work habits household-lives with ?/ food security Enjoys the outdoors \"I hate the winter\" Notices limited activity in the winter    No sleep issues reported       Prior Nutritional Counseling? []Yes     [x]No  When:      Why:         Diet Hx:  Breakfast: Diet:   Protein drink or bowl of original cheerios or \"chocolate delight jodi's\" or protein bar (variety of types, tries to limit sugar)   Lunch: Skips \"I dont feel hungry\" or would have a late breakfast         Dinner: Can of soup or salad \"anything easy\" or healthy choice TV dinner         Snacks: AM - none  PM - none  HS - sweets/cookies/ice cream while watching TV    Drinks water or skim milk (1 glass of milk), may have a coke if goes out to eat   Other Notes/ Initial Assessment:  Pt reports previously following \"crash diet\" strictly consuming salads and protein shakes only in order to lose " "weight. Pt understands that this is not a sustainable or healthy manner to lose weight and desiring guidance. Pt had several food questions \"Is this ok to eat? What are your thought on this?\" During our visit today.     Updated assessment (Follow up note only):           Nutrition Diagnosis:   Food and nutrition related knowledge deficit  related to Lack of prior exposure to accurate nutrition related information as evidenced by No prior knowledge of need for food and nutrition related recommendations       Any change or new dx since previous visit:       Medical Nutrition Therapy Intervention:  [x]Individualized Meal Plan--Advised pt to switch to lactose free milk as she consumes milk regularly and has a hx of IBS-D, \"constant\" bloating. Pt asking author during discussion \"Why am I bloated all the time? Is food causing that?\" Encouraged to speak with GI doctor regarding cause of symptoms, referral placed though has not yet scheduled appointment/awaiting call back from GI clinic.     Encouraged pt to include variety of foods in her diet as she attempts at weight loss/weight management. Discussed variety of lean protein/fruit/vegetable options. Encouraged small frequent meals given hx of IBS-D.     Answered several food questions pt had during discussion.  []Understanding Lab Values   []Basic Pathophysiology of Disease [x]Food/Medication Interactions--Discussed with pt may be beneficial to separate MVI/fiber supplement from taking levothyroxine by 4 h to prevent interference with absorption of medication.    []Food Diary []Exercise   []Lifestyle/Behavior Modification Techniques []Medication, Mechanism of Action   []Label Reading: CHO/ Na/ Fat/ other_________ []Self Blood Glucose Monitoring   [x]Weight/BMI Goals: Discussed with pt clinically would not recommend weight loss though would be appropriate to discuss food/nutrition to prevent further unintentional weight loss.  []Other -     Handouts provided: various " "recipes           Comprehension: []Excellent  []Very Good  [x]Good  []Fair   []Poor    Receptivity: []Excellent  []Very Good  [x]Good  []Fair   []Poor    Expected Compliance: []Excellent  []Very Good  [x]Good  []Fair   []Poor        Goals (initial)/ Progress made on previous goals/new goals:  Pt to maintain weight upon follow up.    2. Pt to achieve reduction in bloating symptoms upon follow up.    3.       No follow-ups on file.  Labs:  CMP  Lab Results   Component Value Date     12/08/2015    K 3.6 04/27/2020     04/27/2020    CO2 28 04/27/2020    ANIONGAP 12 12/08/2015    BUN 9 04/27/2020    CREATININE 0.93 04/27/2020    GLUCOSE 94 12/08/2015    CALCIUM 8.2 (L) 04/27/2020    AST 11 12/08/2015    ALT 24 12/08/2015    ALKPHOS 49 12/08/2015    PROT 7.3 12/08/2015    BILITOT 0.11 (L) 12/08/2015    EGFR 74 04/27/2020       BMP  Lab Results   Component Value Date    GLUCOSE 94 12/08/2015    CALCIUM 8.2 (L) 04/27/2020     12/08/2015    K 3.6 04/27/2020    CO2 28 04/27/2020     04/27/2020    BUN 9 04/27/2020    CREATININE 0.93 04/27/2020       Lipids  No results found for: \"CHOL\"  No results found for: \"HDL\"  No results found for: \"LDLCALC\"  No results found for: \"TRIG\"  No results found for: \"CHOLHDL\"    Hemoglobin A1C  No results found for: \"HGBA1C\"    Fasting Glucose  No results found for: \"GLUF\"    Insulin     Thyroid  No results found for: \"TSH\", \"N6CZLLE\", \"X7DTVYK\", \"THYROIDAB\"    Hepatic Function Panel  Lab Results   Component Value Date    ALT 24 12/08/2015    AST 11 12/08/2015    ALKPHOS 49 12/08/2015    BILITOT 0.11 (L) 12/08/2015       Celiac Disease Antibody Panel  No results found for: \"ENDOMYSIAL IGA\", \"GLIADIN IGA\", \"GLIADIN IGG\", \"IGA\", \"TISSUE TRANSGLUT AB\", \"TTG IGA\"   Iron  No results found for: \"IRON\", \"TIBC\", \"FERRITIN\"         Lauren Reeves, RD, LDN  [facility]  [address] [place] [address]" [place] [id number]

## 2025-02-11 ENCOUNTER — CLINICAL SUPPORT (OUTPATIENT)
Dept: NUTRITION | Facility: CLINIC | Age: 50
End: 2025-02-11
Payer: COMMERCIAL

## 2025-02-11 VITALS — WEIGHT: 150 LBS | BODY MASS INDEX: 22.81 KG/M2

## 2025-02-11 DIAGNOSIS — F41.9 ANXIETY: ICD-10-CM

## 2025-02-11 DIAGNOSIS — E03.9 HYPOTHYROIDISM, UNSPECIFIED TYPE: Primary | ICD-10-CM

## 2025-02-11 PROCEDURE — 97802 MEDICAL NUTRITION INDIV IN: CPT | Performed by: DIETITIAN, REGISTERED

## 2025-02-12 ENCOUNTER — TELEPHONE (OUTPATIENT)
Dept: FAMILY MEDICINE CLINIC | Facility: CLINIC | Age: 50
End: 2025-02-12

## 2025-02-12 ENCOUNTER — OFFICE VISIT (OUTPATIENT)
Dept: PHYSICAL THERAPY | Facility: CLINIC | Age: 50
End: 2025-02-12
Payer: COMMERCIAL

## 2025-02-12 DIAGNOSIS — M22.2X1 PATELLOFEMORAL PAIN SYNDROME OF BOTH KNEES: ICD-10-CM

## 2025-02-12 DIAGNOSIS — M22.2X2 PATELLOFEMORAL PAIN SYNDROME OF BOTH KNEES: ICD-10-CM

## 2025-02-12 DIAGNOSIS — G89.29 CHRONIC PAIN OF BOTH KNEES: Primary | ICD-10-CM

## 2025-02-12 DIAGNOSIS — M25.562 CHRONIC PAIN OF BOTH KNEES: Primary | ICD-10-CM

## 2025-02-12 DIAGNOSIS — M25.561 CHRONIC PAIN OF BOTH KNEES: Primary | ICD-10-CM

## 2025-02-12 PROCEDURE — 97110 THERAPEUTIC EXERCISES: CPT

## 2025-02-12 NOTE — TELEPHONE ENCOUNTER
----- Message from Cary Xiong, DO sent at 2/11/2025  2:06 PM EST -----  Regarding: GI Referral  Another provider for this patient reached out as patient was upset she had not heard from GI regarding the referral I placed in December. Could someone please check on this and see if she can get in with Dr. Dong?    Thank you,  Dr. Xiong

## 2025-02-12 NOTE — PROGRESS NOTES
"Daily Note     Today's date: 2025  Patient name: Yasemin Ha  : 1975  MRN: [id number]  Referring provider: Cary Xiong DO  Dx:   Encounter Diagnosis     ICD-10-CM    1. Chronic pain of both knees  M25.561     M25.562     G89.29       2. Patellofemoral pain syndrome of both knees  M22.2X1     M22.2X2                      Subjective: Patient reports L knee is feeling pretty good- no major pain complaints. She notes HEP has been going well without issue.      Objective: See treatment diary below      Assessment: Tolerated treatment well without complaint. Patient required moderate verbal cues to perform exercises with appropriate technique and intensity. Provided updated HEP printout and reviewed- patient verbalized understanding. Patient would benefit from continued PT to increase B knee strength for improved function in ADLs.      Plan: Continue per plan of care.      Precautions: None     Daily Treatment Diary:      Initial Evaluation Date: 25  Compliance                    Visit Number 1 2                   Re-Eval  IE                 MC   Foto Captured Y                                              Manual                                                                                        Ther-Ex                      QS 5\" x15 5\"x20                   SAQ 2x10 2x10                   SLR 2x10 2x10                   SLR with add 2x10 2x10                   HL adduction 5\" x10 5\"x20                   HL add with bridge 5\" x10 5\"x10                   LAQ 5\"x10 5\"x20                   Fwd/ lateral/retro lunge  Fwd/lat 5x ea B           Mini squats  x10           Step ups/ step down  6\" 20x ea fwd/lat           Good mornings  nv                   Heel raises  20x           Leg press  85# 2x10           TKE standing with TB  GTB 5\"x20                                 Nu-step  5 min                   Neuro Re-Ed                                                                   "                              Ther-Act              pt ed Pathology/ goals/ HEP 8 min                                                Modalities                      HP/CP prn

## 2025-02-12 NOTE — TELEPHONE ENCOUNTER
Called  office Lvm to call patient to schedule her ASAP for a refferal that was placed and sent in dec. I did refax the referreal to Dr.Bellas conway. Left the office number if Dr.Bellas conway had any questions in regards to the refferal but did request them to call patient to get there scheduled

## 2025-02-17 ENCOUNTER — APPOINTMENT (OUTPATIENT)
Dept: PHYSICAL THERAPY | Facility: CLINIC | Age: 50
End: 2025-02-17
Payer: COMMERCIAL

## 2025-02-17 ENCOUNTER — APPOINTMENT (OUTPATIENT)
Dept: RADIOLOGY | Facility: CLINIC | Age: 50
End: 2025-02-17
Payer: COMMERCIAL

## 2025-02-17 ENCOUNTER — OFFICE VISIT (OUTPATIENT)
Dept: FAMILY MEDICINE CLINIC | Facility: CLINIC | Age: 50
End: 2025-02-17
Payer: COMMERCIAL

## 2025-02-17 VITALS
BODY MASS INDEX: 22.39 KG/M2 | DIASTOLIC BLOOD PRESSURE: 72 MMHG | TEMPERATURE: 98.7 F | SYSTOLIC BLOOD PRESSURE: 112 MMHG | HEART RATE: 81 BPM | WEIGHT: 147.71 LBS | OXYGEN SATURATION: 98 % | HEIGHT: 68 IN

## 2025-02-17 DIAGNOSIS — G89.29 CHRONIC PAIN OF BOTH KNEES: ICD-10-CM

## 2025-02-17 DIAGNOSIS — M25.562 CHRONIC PAIN OF BOTH KNEES: ICD-10-CM

## 2025-02-17 DIAGNOSIS — M25.562 CHRONIC PAIN OF BOTH KNEES: Primary | ICD-10-CM

## 2025-02-17 DIAGNOSIS — J06.9 UPPER RESPIRATORY TRACT INFECTION, UNSPECIFIED TYPE: ICD-10-CM

## 2025-02-17 DIAGNOSIS — M25.561 CHRONIC PAIN OF BOTH KNEES: Primary | ICD-10-CM

## 2025-02-17 DIAGNOSIS — G89.29 CHRONIC PAIN OF BOTH KNEES: Primary | ICD-10-CM

## 2025-02-17 DIAGNOSIS — K58.0 IRRITABLE BOWEL SYNDROME WITH DIARRHEA: ICD-10-CM

## 2025-02-17 DIAGNOSIS — M25.561 CHRONIC PAIN OF BOTH KNEES: ICD-10-CM

## 2025-02-17 DIAGNOSIS — M22.2X1 PATELLOFEMORAL PAIN SYNDROME OF BOTH KNEES: ICD-10-CM

## 2025-02-17 DIAGNOSIS — M22.2X2 PATELLOFEMORAL PAIN SYNDROME OF BOTH KNEES: ICD-10-CM

## 2025-02-17 PROCEDURE — 99214 OFFICE O/P EST MOD 30 MIN: CPT | Performed by: FAMILY MEDICINE

## 2025-02-17 PROCEDURE — 73562 X-RAY EXAM OF KNEE 3: CPT

## 2025-02-17 NOTE — PROGRESS NOTES
Name: Yasemin Ha      : 1975      MRN: [id number]  Encounter Provider: Cary Xiong DO  Encounter Date: 2025   Encounter department: [facility]  :  Assessment & Plan  Chronic pain of both knees  Suspect PFPS - pain comes and goes, reports it has not gotten any better with PT or home exercises given to her form PT. It is really bad with certain exercises like step class that she does.   Will obtain xrays to assess for presence and/or severity of OA  Refer to non-surg ortho sports med for further eval  Follow up with me prn     Orders:    Ambulatory Referral to Orthopedic Surgery; Future    Patellofemoral pain syndrome of both knees  See above       Irritable bowel syndrome with diarrhea  Chronic, effecting QOL  Discussed importance of dietary mgmt and provided handouts for this  Refer to GI for further evaluation if symptoms not improving with diet alone  Patient was previously referred - needs to schedule as this is still an ongoing concern of hers - staff asked to help with scheduling if needed            Upper respiratory tract infection, unspecified type  Day 8 and was initially better over the weekend but then woke up feeling worse today.  We discussed this could be concerning for bacterial superimposed infection for which antibiotics we warranted.  Patient would like to still get a few more days to see if she continues to improve, and we discussed I would send the antibiotic to the pharmacy so that if she is not improving into the elbow for her to .  If she starts to improve, she does not need to  the antibiotic.  We discussed additional symptomatic management with over-the-counter home remedies.  Follow-up as needed  Orders:    amoxicillin-clavulanate (AUGMENTIN) 875-125 mg per tablet; Take 1 tablet by mouth every 12 (twelve) hours for 7 days      Return in about 10 months (around 2025) for Annual physical (after ).      "  History of Present Illness     Chief Complaint   Patient presents with    Follow-up       HPI    Went to PT for knees a couple of times. It seemed like it really helped the first time but the second time she didn't get much out of it. She got come great handouts of exercisese to do at home. She is doing these regularly but still having pain on a regular pbasis. In general she feels the pain is still the same. Comes and goes. Pain is at the patella.   No active pain today.     Review of Systems   Constitutional:  Negative for activity change, appetite change, chills, diaphoresis, fever and unexpected weight change.   HENT:  Negative for congestion, rhinorrhea, sore throat and trouble swallowing.    Eyes:  Negative for visual disturbance.   Respiratory:  Negative for cough, shortness of breath and wheezing.    Cardiovascular:  Negative for chest pain, palpitations and leg swelling.   Gastrointestinal:  Positive for diarrhea. Negative for abdominal pain, blood in stool and constipation.   Genitourinary:  Negative for difficulty urinating, dysuria, frequency and hematuria.   Musculoskeletal:  Positive for arthralgias. Negative for back pain and joint swelling.   Skin:  Negative for color change and rash.   Neurological:  Negative for dizziness, light-headedness and headaches.   Psychiatric/Behavioral:  Negative for dysphoric mood. The patient is not nervous/anxious.        Objective   /72 (BP Location: Left arm, Patient Position: Sitting, Cuff Size: Standard)   Pulse 81   Temp 98.7 °F (37.1 °C) (Tympanic)   Ht 5' 8\" (1.727 m)   Wt 67 kg (147 lb 11.3 oz)   SpO2 98%   BMI 22.46 kg/m²      Physical Exam  Vitals reviewed.   Constitutional:       General: She is not in acute distress.     Appearance: Normal appearance. She is normal weight. She is not ill-appearing.   HENT:      Head: Normocephalic and atraumatic.      Nose: Nose normal.   Cardiovascular:      Rate and Rhythm: Normal rate.   Pulmonary:      " Effort: Pulmonary effort is normal.   Musculoskeletal:         General: No swelling, tenderness or deformity.      Cervical back: Neck supple.      Right lower leg: No edema.      Left lower leg: No edema.   Neurological:      General: No focal deficit present.      Mental Status: She is alert.   Psychiatric:         Mood and Affect: Mood normal.         Behavior: Behavior normal.

## 2025-02-26 ENCOUNTER — OFFICE VISIT (OUTPATIENT)
Dept: OBGYN CLINIC | Facility: CLINIC | Age: 50
End: 2025-02-26
Payer: COMMERCIAL

## 2025-02-26 VITALS — HEIGHT: 68 IN | WEIGHT: 145 LBS | BODY MASS INDEX: 21.98 KG/M2

## 2025-02-26 DIAGNOSIS — M25.561 CHRONIC PAIN OF BOTH KNEES: ICD-10-CM

## 2025-02-26 DIAGNOSIS — M22.2X2 PATELLOFEMORAL PAIN SYNDROME OF BOTH KNEES: Primary | ICD-10-CM

## 2025-02-26 DIAGNOSIS — G89.29 CHRONIC PAIN OF BOTH KNEES: ICD-10-CM

## 2025-02-26 DIAGNOSIS — M25.562 CHRONIC PAIN OF BOTH KNEES: ICD-10-CM

## 2025-02-26 DIAGNOSIS — M22.2X1 PATELLOFEMORAL PAIN SYNDROME OF BOTH KNEES: Primary | ICD-10-CM

## 2025-02-26 PROCEDURE — 99203 OFFICE O/P NEW LOW 30 MIN: CPT | Performed by: STUDENT IN AN ORGANIZED HEALTH CARE EDUCATION/TRAINING PROGRAM

## 2025-02-26 NOTE — ASSESSMENT & PLAN NOTE
Left knee worse than right  Clinical exam and radiographic imaging reviewed with patient/parent today, with impression as per above. I have discussed with the patient the pathophysiology of this diagnosis and reviewed how the examination correlates with this diagnosis.  Clinical history exam consistent with patellofemoral knee pain syndrome versus patellofemoral arthritis  Prior imaging reviewed as below.  Treatment options were discussed at length, including risks and benefits; after discussing these treatment options, the patient elected to defer intra-articular CSI of knees today in favor on continued conservative management.  Prescribed patella stabilizing knee brace for left knee.  Counseled to use during activities that aggravate knee pain with a goal of transitioning to out once her patellofemoral pain/clicking improves/resolves.  Counseled she can also cross train with elliptical/cycling and progressive return back to running use of brace as well..  Encouraged either formal PT or home exercise program for at least a minimum of 6 weeks.  Counseled that given this has been ongoing issue for 5 years it may take several weeks to months to fully improve/resolve as well.  In regards to pain control allowed as needed use of acetaminophen, NSAIDs, heat/ice therapy 20 minutes on/off.    Orders:    Ambulatory Referral to Orthopedic Surgery    Brace

## 2025-02-26 NOTE — ASSESSMENT & PLAN NOTE
Orders:    Brace     Wartpeel Counseling:  I discussed with the patient the risks of Wartpeel including but not limited to erythema, scaling, itching, weeping, crusting, and pain.

## 2025-02-26 NOTE — PROGRESS NOTES
Name: Yasemin Ha      : 1975      MRN: [id number]  Encounter Provider: Ian Bragg MD  Encounter Date: 2025   Encounter department: [facility] [place]  :  Assessment & Plan  Chronic pain of both knees  Left knee worse than right  Clinical exam and radiographic imaging reviewed with patient/parent today, with impression as per above. I have discussed with the patient the pathophysiology of this diagnosis and reviewed how the examination correlates with this diagnosis.  Clinical history exam consistent with patellofemoral knee pain syndrome versus patellofemoral arthritis  Prior imaging reviewed as below.  Treatment options were discussed at length, including risks and benefits; after discussing these treatment options, the patient elected to defer intra-articular CSI of knees today in favor on continued conservative management.  Prescribed patella stabilizing knee brace for left knee.  Counseled to use during activities that aggravate knee pain with a goal of transitioning to out once her patellofemoral pain/clicking improves/resolves.  Counseled she can also cross train with elliptical/cycling and progressive return back to running use of brace as well..  Encouraged either formal PT or home exercise program for at least a minimum of 6 weeks.  Counseled that given this has been ongoing issue for 5 years it may take several weeks to months to fully improve/resolve as well.  In regards to pain control allowed as needed use of acetaminophen, NSAIDs, heat/ice therapy 20 minutes on/off.    Orders:    Ambulatory Referral to Orthopedic Surgery    Brace    Patellofemoral pain syndrome of both knees    Orders:    Brace        History of Present Illness   HPI  Yasemin Ha is a 49 y.o. female who presents for bilateral knee pain. Referred by Dr. Xiong  History obtained from: patient  Patient reports ongoing off-and-on issue for the past 5 years.  However she states she has had  "\"clicking\" of her knees since she was a teenager.  Pain worse in the left knee compared to right.  Pain localized over the medial lateral peripatellar joint line.  Denies radiation of the pain.  Describes as a mild to moderate intensity aching sensation.  Positive crepitus with motion.  Reports minimal swelling and denies any discoloration or N/T.  Pain aggravated with running, squatting/kneeling, prolonged sitting.  Worse towards the end of the day.  Improves with resting, avoiding running, NSAIDs.  Patient is frustrated as she previously was a runner and would like to return back to running.  She states her father has a history of arthritis and did get a knee replacement in the 60s.    She had seen her PCP for this issue and had imaging done of her knees already as noted above.  She was referred to PT.  She had attended a couple sessions of formal PT and also has been doing the home exercises.  She felt initially it was providing some relief but continually reaggravated.      Currently pain does not interfere with her activities of daily living but does limit her ability to exercise/run.  She states she did purchase an OTC knee strap/compression sleeve but felt it was not secure enough for her knee as of acute falling off.  She denies undergoing injections of her knees in the past.  She denies prior surgeries of her knees.       Objective   Ht 5' 8\" (1.727 m)   Wt 65.8 kg (145 lb)   BMI 22.05 kg/m²      Physical Exam  Constitutional:  see vital signs  Gen: well-developed, normocephalic/atraumatic, well-groomed  Eyes: No inflammation or discharge of conjunctiva or lids; sclera clear   Pharynx: no inflammation, lesion, or mass of lips  Neck: supple, no masses, non-distended  MSK: no inflammation, lesion, mass, or clubbing of nails and digits except for other than mentioned below  SKIN: no visible rashes or skin lesions  Pulmonary/Chest: Effort normal. No respiratory distress.      Ortho Exam  Left Knee " Exam:  Inspection: No edema, erythema, ecchymosis, open wounds  Increased Warmth: no  Tenderness: +mildly over medial/lateral patellofemoral joint line  ROM: 0-130  Knee flexion strength: 5/5  Knee extension strength: 5/5  Patellar Compression with quadriceps contraction: + pain  Patellar Apprehension: negative  Patellar Grind: +  Lachman's: negative  Anterior Drawer: negative  Varus laxity: negative  Valgus laxity: negative  Abigail: negative     Right Knee Exam:  Inspection: No edema, erythema, ecchymosis, open wounds  Increased Warmth: no  Tenderness: +mild of medial patellofemoral joint line  ROM: 0-130  Knee flexion strength: 5/5  Knee extension strength: 5/5  Patellar Compression with quadriceps contraction: denies pain  Patellar Apprehension: negative  Patellar Grind: +  Lachman's: negative  Anterior Drawer: negative  Varus laxity: negative  Valgus laxity: negative  Abigail: negative     Gait: normal without antalgia        Imaging Studies (I personally reviewed images in PACS and report):  XR knee 3 vw left non injury  Result Date: 2/17/2025  Narrative: LEFT KNEE INDICATION:   Pain in right knee. Pain in left knee. Other chronic pain. COMPARISON:  None. VIEWS:  XR KNEE 3 VW LEFT NON INJURY FINDINGS: There is no acute fracture or dislocation. There is no joint effusion. No significant degenerative changes. No lytic or blastic osseous lesion. Soft tissues are unremarkable.     Impression: No acute osseous abnormality. Electronically signed: 02/17/2025 04:26 PM Tito Fernandez MD    XR knee 3 vw right non injury  Result Date: 2/17/2025  Narrative: RIGHT KNEE INDICATION:   Pain in right knee. Pain in left knee. Other chronic pain.   COMPARISON:  None. VIEWS:  XR KNEE 3 VW RIGHT NON INJURY FINDINGS: There is no acute fracture or dislocation. There is no joint effusion. No significant degenerative changes. No lytic or blastic osseous lesion. Soft tissues are unremarkable.     Impression: No acute osseous  "abnormality. Electronically signed: 02/17/2025 04:26 PM Tito Fernandez MD      Procedures    -----------------------------------------------------------------  Portions of the record may have been created with voice recognition software. Occasional wrong word or \"sound a like\" substitutions may have occurred due to the inherent limitations of voice recognition software. Read the chart carefully and recognize, using context, where substitutions have occurred.     "

## 2025-03-27 ENCOUNTER — OFFICE VISIT (OUTPATIENT)
Dept: FAMILY MEDICINE CLINIC | Facility: CLINIC | Age: 50
End: 2025-03-27
Payer: COMMERCIAL

## 2025-03-27 VITALS
OXYGEN SATURATION: 98 % | SYSTOLIC BLOOD PRESSURE: 109 MMHG | WEIGHT: 141.09 LBS | TEMPERATURE: 97.9 F | DIASTOLIC BLOOD PRESSURE: 59 MMHG | BODY MASS INDEX: 21.45 KG/M2 | HEART RATE: 73 BPM

## 2025-03-27 DIAGNOSIS — B00.9 HERPES SIMPLEX: ICD-10-CM

## 2025-03-27 DIAGNOSIS — F41.1 GAD (GENERALIZED ANXIETY DISORDER): Primary | ICD-10-CM

## 2025-03-27 DIAGNOSIS — G43.009 MIGRAINE WITHOUT AURA AND WITHOUT STATUS MIGRAINOSUS, NOT INTRACTABLE: ICD-10-CM

## 2025-03-27 DIAGNOSIS — N95.1 PERIMENOPAUSAL SYMPTOMS: ICD-10-CM

## 2025-03-27 PROCEDURE — 99214 OFFICE O/P EST MOD 30 MIN: CPT | Performed by: PHYSICIAN ASSISTANT

## 2025-03-27 RX ORDER — COLESTIPOL HYDROCHLORIDE 1 G/1
1 TABLET ORAL 2 TIMES DAILY
COMMUNITY

## 2025-03-27 RX ORDER — VALACYCLOVIR HYDROCHLORIDE 1 G/1
1000 TABLET, FILM COATED ORAL DAILY
Qty: 30 TABLET | Refills: 0 | Status: SHIPPED | OUTPATIENT
Start: 2025-03-27 | End: 2025-04-26

## 2025-03-27 RX ORDER — BUSPIRONE HYDROCHLORIDE 5 MG/1
5 TABLET ORAL 2 TIMES DAILY
Qty: 60 TABLET | Refills: 5 | Status: SHIPPED | OUTPATIENT
Start: 2025-03-27

## 2025-04-18 DIAGNOSIS — F41.1 GAD (GENERALIZED ANXIETY DISORDER): ICD-10-CM

## 2025-04-18 RX ORDER — BUSPIRONE HYDROCHLORIDE 5 MG/1
5 TABLET ORAL 2 TIMES DAILY
Qty: 180 TABLET | Refills: 1 | Status: SHIPPED | OUTPATIENT
Start: 2025-04-18

## 2025-04-24 DIAGNOSIS — E03.9 HYPOTHYROIDISM, UNSPECIFIED TYPE: ICD-10-CM

## 2025-04-24 RX ORDER — LEVOTHYROXINE SODIUM 50 UG/1
50 TABLET ORAL DAILY
Qty: 30 TABLET | Refills: 0 | Status: SHIPPED | OUTPATIENT
Start: 2025-04-24

## 2025-05-20 DIAGNOSIS — F41.9 ANXIETY: Primary | ICD-10-CM

## 2025-05-20 RX ORDER — CITALOPRAM HYDROBROMIDE 20 MG/1
20 TABLET ORAL DAILY
Qty: 90 TABLET | Refills: 1 | Status: SHIPPED | OUTPATIENT
Start: 2025-05-20

## (undated) DEVICE — ABDOMINAL PAD: Brand: DERMACEA

## (undated) DEVICE — SILVER-COATED ANTIMICROBIAL BARRIER DRESSING: Brand: ACTICOAT   4" X 8"

## (undated) DEVICE — OCCLUSIVE GAUZE STRIP,3% BISMUTH TRIBROMOPHENATE IN PETROLATUM BLEND: Brand: XEROFORM

## (undated) DEVICE — PLUMEPEN PRO 10FT

## (undated) DEVICE — INTENDED FOR TISSUE SEPARATION, AND OTHER PROCEDURES THAT REQUIRE A SHARP SURGICAL BLADE TO PUNCTURE OR CUT.: Brand: BARD-PARKER SAFETY BLADES SIZE 10, STERILE

## (undated) DEVICE — TIBURON HAND DRAPE: Brand: CONVERTORS

## (undated) DEVICE — DRAPE C-ARM X-RAY

## (undated) DEVICE — ACE WRAP 4 IN STERILE

## (undated) DEVICE — GAUZE SPONGES,16 PLY: Brand: CURITY

## (undated) DEVICE — ACE WRAP 4 IN UNSTERILE

## (undated) DEVICE — INTENDED FOR TISSUE SEPARATION, AND OTHER PROCEDURES THAT REQUIRE A SHARP SURGICAL BLADE TO PUNCTURE OR CUT.: Brand: BARD-PARKER ® SAFETYLOCK CARBON RIB-BACK BLADES

## (undated) DEVICE — STOCKINETTE REGULAR

## (undated) DEVICE — PAD GROUNDING ADULT

## (undated) DEVICE — STERILE POLYISOPRENE POWDER-FREE SURGICAL GLOVES WITH EMOLLIENT COATING: Brand: PROTEXIS

## (undated) DEVICE — DRAPE SHEET THREE QUARTER

## (undated) DEVICE — U-DRAPE: Brand: CONVERTORS

## (undated) DEVICE — CURITY NON-ADHERENT STRIPS: Brand: CURITY

## (undated) DEVICE — SUT ETHILON 4-0 PS-2 18 IN 1667H

## (undated) DEVICE — STERILE POLYISOPRENE POWDER-FREE SURGICAL GLOVES: Brand: PROTEXIS

## (undated) DEVICE — GLOVE SRG BIOGEL 7.5

## (undated) DEVICE — SPLINT 4 X 15 IN FAST SET PLASTER

## (undated) DEVICE — STERILE BETHLEHEM PLASTIC HAND: Brand: CARDINAL HEALTH

## (undated) DEVICE — DISPOSABLE EQUIPMENT COVER: Brand: SMALL TOWEL DRAPE

## (undated) DEVICE — CUFF TOURNIQUET DISP SZ18

## (undated) DEVICE — SYRINGE 10ML LL

## (undated) DEVICE — PADDING CAST 4 IN  COTTON STRL

## (undated) DEVICE — NEEDLE BLUNT 18 G X 1 1/2IN

## (undated) DEVICE — STANDARD SURGICAL GOWN, L: Brand: CONVERTORS

## (undated) DEVICE — ALUMI-HAND POSITIONER LG

## (undated) DEVICE — NEEDLE 25G X 1 1/2

## (undated) DEVICE — SUT VICRYL 2-0 CTB-1 36 IN JB945

## (undated) DEVICE — SPONGE PVP SCRUB WING STERILE

## (undated) DEVICE — ACE WRAP 6 IN UNSTERILE

## (undated) DEVICE — CHLORAPREP HI-LITE 26ML ORANGE

## (undated) DEVICE — BETHLEHEM UNIVERSAL  MIONR EXT: Brand: CARDINAL HEALTH

## (undated) DEVICE — VESSEL LOOPS X-RAY DETECTABLE: Brand: DEROYAL

## (undated) DEVICE — 3M™ STERI-DRAPE™ U-DRAPE 1015: Brand: STERI-DRAPE™

## (undated) DEVICE — PAD CAST 4 IN COTTON NON STERILE

## (undated) DEVICE — CUFF TOURNIQUET 18 X 4 IN QUICK CONNECT DISP 1 BLADDER

## (undated) DEVICE — SUT VICRYL 1 CTB-1 36 IN JB947

## (undated) DEVICE — ASTOUND STANDARD SURGICAL GOWN, XXL: Brand: CONVERTORS

## (undated) DEVICE — GLOVE INDICATOR PI UNDERGLOVE SZ 8 BLUE

## (undated) DEVICE — Device

## (undated) DEVICE — COBAN 4 IN STERILE